# Patient Record
Sex: MALE | Race: WHITE | NOT HISPANIC OR LATINO | URBAN - METROPOLITAN AREA
[De-identification: names, ages, dates, MRNs, and addresses within clinical notes are randomized per-mention and may not be internally consistent; named-entity substitution may affect disease eponyms.]

---

## 2021-11-24 ENCOUNTER — INPATIENT (INPATIENT)
Facility: HOSPITAL | Age: 56
LOS: 2 days | Discharge: HOME | End: 2021-11-27
Attending: INTERNAL MEDICINE | Admitting: INTERNAL MEDICINE
Payer: COMMERCIAL

## 2021-11-24 VITALS
WEIGHT: 198.42 LBS | OXYGEN SATURATION: 99 % | HEART RATE: 61 BPM | SYSTOLIC BLOOD PRESSURE: 172 MMHG | RESPIRATION RATE: 18 BRPM | DIASTOLIC BLOOD PRESSURE: 95 MMHG | TEMPERATURE: 96 F | HEIGHT: 71.65 IN

## 2021-11-24 LAB
ALBUMIN SERPL ELPH-MCNC: 4.7 G/DL — SIGNIFICANT CHANGE UP (ref 3.5–5.2)
ALP SERPL-CCNC: 44 U/L — SIGNIFICANT CHANGE UP (ref 30–115)
ALT FLD-CCNC: 27 U/L — SIGNIFICANT CHANGE UP (ref 0–41)
ANION GAP SERPL CALC-SCNC: 11 MMOL/L — SIGNIFICANT CHANGE UP (ref 7–14)
APTT BLD: 103.7 SEC — CRITICAL HIGH (ref 27–39.2)
APTT BLD: 34.4 SEC — SIGNIFICANT CHANGE UP (ref 27–39.2)
AST SERPL-CCNC: 71 U/L — HIGH (ref 0–41)
BASOPHILS # BLD AUTO: 0.04 K/UL — SIGNIFICANT CHANGE UP (ref 0–0.2)
BASOPHILS NFR BLD AUTO: 0.4 % — SIGNIFICANT CHANGE UP (ref 0–1)
BILIRUB SERPL-MCNC: 0.4 MG/DL — SIGNIFICANT CHANGE UP (ref 0.2–1.2)
BUN SERPL-MCNC: 17 MG/DL — SIGNIFICANT CHANGE UP (ref 10–20)
CALCIUM SERPL-MCNC: 9.2 MG/DL — SIGNIFICANT CHANGE UP (ref 8.5–10.1)
CHLORIDE SERPL-SCNC: 106 MMOL/L — SIGNIFICANT CHANGE UP (ref 98–110)
CK MB CFR SERPL CALC: 32.2 NG/ML — HIGH (ref 0.6–6.3)
CK SERPL-CCNC: 624 U/L — HIGH (ref 0–225)
CO2 SERPL-SCNC: 25 MMOL/L — SIGNIFICANT CHANGE UP (ref 17–32)
CREAT SERPL-MCNC: 1 MG/DL — SIGNIFICANT CHANGE UP (ref 0.7–1.5)
EOSINOPHIL # BLD AUTO: 0.13 K/UL — SIGNIFICANT CHANGE UP (ref 0–0.7)
EOSINOPHIL NFR BLD AUTO: 1.4 % — SIGNIFICANT CHANGE UP (ref 0–8)
GLUCOSE SERPL-MCNC: 93 MG/DL — SIGNIFICANT CHANGE UP (ref 70–99)
HCT VFR BLD CALC: 47.3 % — SIGNIFICANT CHANGE UP (ref 42–52)
HGB BLD-MCNC: 15.7 G/DL — SIGNIFICANT CHANGE UP (ref 14–18)
IMM GRANULOCYTES NFR BLD AUTO: 0.1 % — SIGNIFICANT CHANGE UP (ref 0.1–0.3)
INR BLD: 1.04 RATIO — SIGNIFICANT CHANGE UP (ref 0.65–1.3)
LYMPHOCYTES # BLD AUTO: 2.18 K/UL — SIGNIFICANT CHANGE UP (ref 1.2–3.4)
LYMPHOCYTES # BLD AUTO: 24 % — SIGNIFICANT CHANGE UP (ref 20.5–51.1)
MAGNESIUM SERPL-MCNC: 2 MG/DL — SIGNIFICANT CHANGE UP (ref 1.8–2.4)
MCHC RBC-ENTMCNC: 29.5 PG — SIGNIFICANT CHANGE UP (ref 27–31)
MCHC RBC-ENTMCNC: 33.2 G/DL — SIGNIFICANT CHANGE UP (ref 32–37)
MCV RBC AUTO: 88.7 FL — SIGNIFICANT CHANGE UP (ref 80–94)
MONOCYTES # BLD AUTO: 0.76 K/UL — HIGH (ref 0.1–0.6)
MONOCYTES NFR BLD AUTO: 8.4 % — SIGNIFICANT CHANGE UP (ref 1.7–9.3)
NEUTROPHILS # BLD AUTO: 5.95 K/UL — SIGNIFICANT CHANGE UP (ref 1.4–6.5)
NEUTROPHILS NFR BLD AUTO: 65.7 % — SIGNIFICANT CHANGE UP (ref 42.2–75.2)
NRBC # BLD: 0 /100 WBCS — SIGNIFICANT CHANGE UP (ref 0–0)
NT-PROBNP SERPL-SCNC: 192 PG/ML — SIGNIFICANT CHANGE UP (ref 0–300)
PLATELET # BLD AUTO: 170 K/UL — SIGNIFICANT CHANGE UP (ref 130–400)
POTASSIUM SERPL-MCNC: 4.2 MMOL/L — SIGNIFICANT CHANGE UP (ref 3.5–5)
POTASSIUM SERPL-SCNC: 4.2 MMOL/L — SIGNIFICANT CHANGE UP (ref 3.5–5)
PROT SERPL-MCNC: 6.7 G/DL — SIGNIFICANT CHANGE UP (ref 6–8)
PROTHROM AB SERPL-ACNC: 11.9 SEC — SIGNIFICANT CHANGE UP (ref 9.95–12.87)
RBC # BLD: 5.33 M/UL — SIGNIFICANT CHANGE UP (ref 4.7–6.1)
RBC # FLD: 12.3 % — SIGNIFICANT CHANGE UP (ref 11.5–14.5)
SARS-COV-2 RNA SPEC QL NAA+PROBE: SIGNIFICANT CHANGE UP
SODIUM SERPL-SCNC: 142 MMOL/L — SIGNIFICANT CHANGE UP (ref 135–146)
TROPONIN T SERPL-MCNC: 0.83 NG/ML — CRITICAL HIGH
TROPONIN T SERPL-MCNC: 1.33 NG/ML — CRITICAL HIGH
TROPONIN T SERPL-MCNC: 1.48 NG/ML — CRITICAL HIGH
WBC # BLD: 9.07 K/UL — SIGNIFICANT CHANGE UP (ref 4.8–10.8)
WBC # FLD AUTO: 9.07 K/UL — SIGNIFICANT CHANGE UP (ref 4.8–10.8)

## 2021-11-24 PROCEDURE — 93010 ELECTROCARDIOGRAM REPORT: CPT

## 2021-11-24 PROCEDURE — 99223 1ST HOSP IP/OBS HIGH 75: CPT

## 2021-11-24 PROCEDURE — 99291 CRITICAL CARE FIRST HOUR: CPT

## 2021-11-24 PROCEDURE — 71045 X-RAY EXAM CHEST 1 VIEW: CPT | Mod: 26

## 2021-11-24 RX ORDER — METOPROLOL TARTRATE 50 MG
25 TABLET ORAL
Refills: 0 | Status: DISCONTINUED | OUTPATIENT
Start: 2021-11-24 | End: 2021-11-27

## 2021-11-24 RX ORDER — ASPIRIN/CALCIUM CARB/MAGNESIUM 324 MG
81 TABLET ORAL DAILY
Refills: 0 | Status: DISCONTINUED | OUTPATIENT
Start: 2021-11-25 | End: 2021-11-27

## 2021-11-24 RX ORDER — INFLUENZA VIRUS VACCINE 15; 15; 15; 15 UG/.5ML; UG/.5ML; UG/.5ML; UG/.5ML
0.5 SUSPENSION INTRAMUSCULAR ONCE
Refills: 0 | Status: DISCONTINUED | OUTPATIENT
Start: 2021-11-24 | End: 2021-11-26

## 2021-11-24 RX ORDER — HEPARIN SODIUM 5000 [USP'U]/ML
1500 INJECTION INTRAVENOUS; SUBCUTANEOUS
Qty: 25000 | Refills: 0 | Status: DISCONTINUED | OUTPATIENT
Start: 2021-11-24 | End: 2021-11-26

## 2021-11-24 RX ORDER — HEPARIN SODIUM 5000 [USP'U]/ML
INJECTION INTRAVENOUS; SUBCUTANEOUS
Qty: 25000 | Refills: 0 | Status: DISCONTINUED | OUTPATIENT
Start: 2021-11-24 | End: 2021-11-24

## 2021-11-24 RX ORDER — HEPARIN SODIUM 5000 [USP'U]/ML
7000 INJECTION INTRAVENOUS; SUBCUTANEOUS ONCE
Refills: 0 | Status: COMPLETED | OUTPATIENT
Start: 2021-11-24 | End: 2021-11-24

## 2021-11-24 RX ORDER — HEPARIN SODIUM 5000 [USP'U]/ML
1600 INJECTION INTRAVENOUS; SUBCUTANEOUS
Qty: 25000 | Refills: 0 | Status: DISCONTINUED | OUTPATIENT
Start: 2021-11-24 | End: 2021-11-24

## 2021-11-24 RX ORDER — ONDANSETRON 8 MG/1
4 TABLET, FILM COATED ORAL EVERY 8 HOURS
Refills: 0 | Status: DISCONTINUED | OUTPATIENT
Start: 2021-11-24 | End: 2021-11-27

## 2021-11-24 RX ORDER — ACETAMINOPHEN 500 MG
650 TABLET ORAL EVERY 6 HOURS
Refills: 0 | Status: DISCONTINUED | OUTPATIENT
Start: 2021-11-24 | End: 2021-11-27

## 2021-11-24 RX ORDER — ATORVASTATIN CALCIUM 80 MG/1
80 TABLET, FILM COATED ORAL AT BEDTIME
Refills: 0 | Status: DISCONTINUED | OUTPATIENT
Start: 2021-11-24 | End: 2021-11-27

## 2021-11-24 RX ORDER — ASPIRIN/CALCIUM CARB/MAGNESIUM 324 MG
324 TABLET ORAL ONCE
Refills: 0 | Status: COMPLETED | OUTPATIENT
Start: 2021-11-24 | End: 2021-11-24

## 2021-11-24 RX ORDER — LANOLIN ALCOHOL/MO/W.PET/CERES
3 CREAM (GRAM) TOPICAL AT BEDTIME
Refills: 0 | Status: DISCONTINUED | OUTPATIENT
Start: 2021-11-24 | End: 2021-11-27

## 2021-11-24 RX ADMIN — ATORVASTATIN CALCIUM 80 MILLIGRAM(S): 80 TABLET, FILM COATED ORAL at 21:15

## 2021-11-24 RX ADMIN — Medication 324 MILLIGRAM(S): at 14:00

## 2021-11-24 RX ADMIN — HEPARIN SODIUM 7000 UNIT(S): 5000 INJECTION INTRAVENOUS; SUBCUTANEOUS at 14:00

## 2021-11-24 RX ADMIN — HEPARIN SODIUM 1600 UNIT(S)/HR: 5000 INJECTION INTRAVENOUS; SUBCUTANEOUS at 14:30

## 2021-11-24 NOTE — H&P ADULT - HISTORY OF PRESENT ILLNESS
56 years old male, Active smoker, no known PMHx  presents to ED for the evaluation for the chest pain for last two weeks.    Patient reports that he has been having chest pain for last couple of weeks. Pain is located on the left side of the chest, intermittent, 7/10 in intensity, non-radiating, precipitated by smoking and relives by its own within 5-10 minutes. He is a , he reports that yesterday he lifted some heavy weights that caused him some pain in both the shoulders. Today he decided to walk in to ED for the evaluation of chest pain.   He denies any SOB, palpitation, diaphoresis, lightheadedness, family Hx of premature cardiac disease.     In the ED labs were unremarkable except for troponin of 0.83.  EKG showed normal sinus rhythm w/o ST-wave changes.   Vitals signs were remarkable for BP of 172/95 mmhg

## 2021-11-24 NOTE — H&P ADULT - ASSESSMENT
56 years old male, Active smoker, no known PMHx  presents to ED for the evaluation for the chest pain for last two weeks.    Impression:  # NSTEMI    Plan :    - patient w/ Hx of atypical chest pain for last two weeks  - Vital signs normal  - elevated troponin 0.83, trend troponin. No EKG change noted  - s/p  mg and heparin bolus  in ED  - c/w heparin gtt, monitor PTT  - started on BB and Statin  - 2D echo ordered  - check lipid profile and A1C   - admit to ICU, cardiology to follow    GI ppx : not needed  DVT ppx : heparin gtt  bedrest  Code : full

## 2021-11-24 NOTE — PATIENT PROFILE ADULT - NSPROIMPLANTSMEDDEV_GEN_A_NUR
Patient reported significant abdominal pain with radiation to back during IV Iron infusion.  Patient writhing in pain in bed and assisted to bedside commode.  Patient had large bowel movement which was partially formed and turned to liquid.  Post bowel movement, patient continued to report severe abdominal pain.  Provider notified and orders received.   None

## 2021-11-24 NOTE — PATIENT PROFILE ADULT - NSTOBACCOCESSATIONEDU4_GEN_A_NUR
March 13, 2019      June Lovell   [address] [facility] [id number]       Dear June Lovell:    Our records indicate that you have missed, or failed to cancel, one or more appointments with us during the last 12 months.    [facility] requests that patients notify the provider's office of cancellations by 5:00 pm the day prior to their appointment, and to arrive within 10 minutes of the scheduled appointment time.  For appointments scheduled on Monday, please notify the appointment desk by noon on Saturday.  MyChart patients can cancel their appointments on their MyChart page.    Cancelling your appointment with proper notice allows other patients the opportunity to be seen. The [facility] policy states that a fee of $50 will be assessed only after you miss three or more appointments in a 12 month period.    The following are your missed appointments:  Recent No Show Appointments     No appointments to display           Your health care is important to us.  Please call our office at your earliest convenience to reschedule your appointment or to inform us of any possible scheduling errors.      We look forward to hearing from you soon. Thank you for understanding our position in regards to this problem. If you have any questions, please don't hesitate to contact us.       Sincerely,  [facility]   Smoking even a single puff increases the likelihood of a full relapse, withdrawal symptoms peak within 1-2 weeks, but can persist for months

## 2021-11-24 NOTE — CONSULT NOTE ADULT - ASSESSMENT
Assessment:  55yo male with c/o chest pain admitted to ICU for NSTEMI. 1hr episode of left sided chest pain yesterday evening with resolution prior bedtime. Similar episode 1 week ago. Denies medical history or medication use. Current everyday smoker, works in construction, last stress test 2 years ago - negative. Aspirin 325mg PO given in ED, on Heparin drip currently, pain free.    Plan:  *NSTEMI  no ST changes on ecg  - ICU care  - Cath Friday  - c/w Heparin drip and titrate per protocol  - on Metoprolol  - add atorvastatin 80 mg daily  - trend troponin pending  - order TTE  - check CK-MB, CK, TSH, Mg, Phos  - serial ecg  - NPO  - trend lytes and replete  - bed rest

## 2021-11-24 NOTE — H&P ADULT - NSHPPHYSICALEXAM_GEN_ALL_CORE
LOS:     VITALS:   T(C): 36.4 (11-24-21 @ 14:27), Max: 36.4 (11-24-21 @ 14:27)  HR: 62 (11-24-21 @ 14:49) (61 - 62)  BP: 156/88 (11-24-21 @ 14:49) (156/88 - 172/95)  RR: 18 (11-24-21 @ 14:49) (18 - 18)  SpO2: 98% (11-24-21 @ 14:49) (98% - 99%)    GENERAL: NAD, lying in bed comfortably  HEAD:  Atraumatic, Normocephalic  EYES: EOMI, PERRLA, conjunctiva and sclera clear  ENT: Moist mucous membranes  NECK: Supple, No JVD  CHEST/LUNG: Clear to auscultation bilaterally; No rales, rhonchi, wheezing, or rubs. Unlabored respirations  HEART: Regular rate and rhythm; No murmurs, rubs, or gallops  ABDOMEN: BSx4; Soft, nontender, nondistended  EXTREMITIES:  2+ Peripheral Pulses, brisk capillary refill. No clubbing, cyanosis, or edema  NERVOUS SYSTEM:  A&Ox3, no focal deficits   SKIN: No rashes or lesions

## 2021-11-24 NOTE — ED PROVIDER NOTE - PROGRESS NOTE DETAILS
approved to ccu by shmuel, rec asa and heparin. I personally evaluated the patient. I reviewed the Resident’s or Physician Assistant’s note (as assigned above), and agree with the findings and plan except as documented in my note.  55 y/o M with no PMHx, not on any medications presents with intermittent left sided CP that began yesterday morning. Pt states the pain started yesterday morning while at home and resided before intensifying last night which woke the pt from sleeping. Pt states it was slightly improved this morning, but still present so he came to the ED. Denies fever, chills, HA, diaphoresis, SOB, abdominal pain, n/v, numbness, weakness and tingling. On exam: NCAT. PERRLA, EOMI. OP clear. Lungs CTAB. RRR, S1S2 noted. Radial pulses 2+ and equal, pedal pulses 2+ and equal. Abdomen soft, NT/ND, no rebound or guarding. FROM x4 extremities. No focal neuro deficits. A/P: pt hx concerning for potential ACS. Obtained EKG, no STEMI noted. Will troponin and likely admit.

## 2021-11-24 NOTE — H&P ADULT - ATTENDING COMMENTS
56 years old male, Active smoker, no known PMHx  presents to ED for the evaluation for the chest pain for last two weeks.    NSTEMI     - pt seen and evaluated on 11/24   - DC smoking   - aspirin, Lipitor, lopresor   - repeat CE and EKG   - 2DECHO   - cardiology consult

## 2021-11-24 NOTE — ED PROVIDER NOTE - OBJECTIVE STATEMENT
55 yo male, no pmh, + tob use, presents to ed for cp, left sided, started yesterday, mild, aching, no radiation. denies fever,chills, sob, abd pain, nvd.

## 2021-11-24 NOTE — CONSULT NOTE ADULT - ATTENDING COMMENTS
Patient told he had mi. Told he when stable will need a cardiac cath . He is aware risks and benefits. OOB to chair. Long discussion re need stop smoking.

## 2021-11-24 NOTE — ED PROVIDER NOTE - ATTENDING CONTRIBUTION TO CARE
I was present for and supervised the key and critical aspects of the procedures performed during the care of the patient. I personally evaluated the patient. I reviewed the Resident’s or Physician Assistant’s note (as assigned above), and agree with the findings and plan except as documented in my note.  55 y/o M with no PMHx, not on any medications presents with intermittent left sided CP that began yesterday morning. Pt states the pain started yesterday morning while at home and resided before intensifying last night which woke the pt from sleeping. Pt states it was slightly improved this morning, but still present so he came to the ED. Denies fever, chills, HA, diaphoresis, SOB, abdominal pain, n/v, numbness, weakness and tingling. On exam: NCAT. PERRLA, EOMI. OP clear. Lungs CTAB. RRR, S1S2 noted. Radial pulses 2+ and equal, pedal pulses 2+ and equal. Abdomen soft, NT/ND, no rebound or guarding. FROM x4 extremities. No focal neuro deficits. A/P: pt hx concerning for potential ACS. Obtained EKG, no STEMI noted. Will obtain troponin and likely admit to ccu.

## 2021-11-24 NOTE — ED PROVIDER NOTE - CLINICAL SUMMARY MEDICAL DECISION MAKING FREE TEXT BOX
patient admitted to ccu concern for nstemi given presentation and past medical history we discussed the case with dr gutierrez I will admit for further workup at this time

## 2021-11-24 NOTE — ED ADULT NURSE NOTE - CAS EDN DISCHARGE ASSESSMENT
Patient baseline mental status Alert and oriented to person, place and time/Patient baseline mental status/No adverse reaction to first time med in ED

## 2021-11-25 LAB
A1C WITH ESTIMATED AVERAGE GLUCOSE RESULT: 5.7 % — HIGH (ref 4–5.6)
ALBUMIN SERPL ELPH-MCNC: 4.5 G/DL — SIGNIFICANT CHANGE UP (ref 3.5–5.2)
ALP SERPL-CCNC: 42 U/L — SIGNIFICANT CHANGE UP (ref 30–115)
ALT FLD-CCNC: 29 U/L — SIGNIFICANT CHANGE UP (ref 0–41)
ANION GAP SERPL CALC-SCNC: 13 MMOL/L — SIGNIFICANT CHANGE UP (ref 7–14)
APTT BLD: 83.6 SEC — CRITICAL HIGH (ref 27–39.2)
APTT BLD: 84.5 SEC — CRITICAL HIGH (ref 27–39.2)
AST SERPL-CCNC: 60 U/L — HIGH (ref 0–41)
BASOPHILS # BLD AUTO: 0.05 K/UL — SIGNIFICANT CHANGE UP (ref 0–0.2)
BASOPHILS NFR BLD AUTO: 0.6 % — SIGNIFICANT CHANGE UP (ref 0–1)
BILIRUB SERPL-MCNC: 0.4 MG/DL — SIGNIFICANT CHANGE UP (ref 0.2–1.2)
BUN SERPL-MCNC: 15 MG/DL — SIGNIFICANT CHANGE UP (ref 10–20)
CALCIUM SERPL-MCNC: 8.9 MG/DL — SIGNIFICANT CHANGE UP (ref 8.5–10.1)
CHLORIDE SERPL-SCNC: 101 MMOL/L — SIGNIFICANT CHANGE UP (ref 98–110)
CHOLEST SERPL-MCNC: 170 MG/DL — SIGNIFICANT CHANGE UP
CK MB CFR SERPL CALC: 15.5 NG/ML — HIGH (ref 0.6–6.3)
CK SERPL-CCNC: 405 U/L — HIGH (ref 0–225)
CO2 SERPL-SCNC: 21 MMOL/L — SIGNIFICANT CHANGE UP (ref 17–32)
CREAT SERPL-MCNC: 1 MG/DL — SIGNIFICANT CHANGE UP (ref 0.7–1.5)
EOSINOPHIL # BLD AUTO: 0.15 K/UL — SIGNIFICANT CHANGE UP (ref 0–0.7)
EOSINOPHIL NFR BLD AUTO: 1.8 % — SIGNIFICANT CHANGE UP (ref 0–8)
ESTIMATED AVERAGE GLUCOSE: 117 MG/DL — HIGH (ref 68–114)
GLUCOSE SERPL-MCNC: 112 MG/DL — HIGH (ref 70–99)
HCT VFR BLD CALC: 45.5 % — SIGNIFICANT CHANGE UP (ref 42–52)
HCV AB S/CO SERPL IA: 0.05 COI — SIGNIFICANT CHANGE UP
HCV AB SERPL-IMP: SIGNIFICANT CHANGE UP
HDLC SERPL-MCNC: 33 MG/DL — LOW
HGB BLD-MCNC: 15.5 G/DL — SIGNIFICANT CHANGE UP (ref 14–18)
IMM GRANULOCYTES NFR BLD AUTO: 0.2 % — SIGNIFICANT CHANGE UP (ref 0.1–0.3)
LIPID PNL WITH DIRECT LDL SERPL: 107 MG/DL — HIGH
LYMPHOCYTES # BLD AUTO: 2.48 K/UL — SIGNIFICANT CHANGE UP (ref 1.2–3.4)
LYMPHOCYTES # BLD AUTO: 29.9 % — SIGNIFICANT CHANGE UP (ref 20.5–51.1)
MCHC RBC-ENTMCNC: 29.4 PG — SIGNIFICANT CHANGE UP (ref 27–31)
MCHC RBC-ENTMCNC: 34.1 G/DL — SIGNIFICANT CHANGE UP (ref 32–37)
MCV RBC AUTO: 86.3 FL — SIGNIFICANT CHANGE UP (ref 80–94)
MONOCYTES # BLD AUTO: 0.65 K/UL — HIGH (ref 0.1–0.6)
MONOCYTES NFR BLD AUTO: 7.8 % — SIGNIFICANT CHANGE UP (ref 1.7–9.3)
NEUTROPHILS # BLD AUTO: 4.94 K/UL — SIGNIFICANT CHANGE UP (ref 1.4–6.5)
NEUTROPHILS NFR BLD AUTO: 59.7 % — SIGNIFICANT CHANGE UP (ref 42.2–75.2)
NON HDL CHOLESTEROL: 137 MG/DL — HIGH
NRBC # BLD: 0 /100 WBCS — SIGNIFICANT CHANGE UP (ref 0–0)
PLATELET # BLD AUTO: 154 K/UL — SIGNIFICANT CHANGE UP (ref 130–400)
POTASSIUM SERPL-MCNC: 4.1 MMOL/L — SIGNIFICANT CHANGE UP (ref 3.5–5)
POTASSIUM SERPL-SCNC: 4.1 MMOL/L — SIGNIFICANT CHANGE UP (ref 3.5–5)
PROT SERPL-MCNC: 6.4 G/DL — SIGNIFICANT CHANGE UP (ref 6–8)
RBC # BLD: 5.27 M/UL — SIGNIFICANT CHANGE UP (ref 4.7–6.1)
RBC # FLD: 12.3 % — SIGNIFICANT CHANGE UP (ref 11.5–14.5)
SODIUM SERPL-SCNC: 135 MMOL/L — SIGNIFICANT CHANGE UP (ref 135–146)
TRIGL SERPL-MCNC: 177 MG/DL — HIGH
TROPONIN T SERPL-MCNC: 0.76 NG/ML — CRITICAL HIGH
WBC # BLD: 8.29 K/UL — SIGNIFICANT CHANGE UP (ref 4.8–10.8)
WBC # FLD AUTO: 8.29 K/UL — SIGNIFICANT CHANGE UP (ref 4.8–10.8)

## 2021-11-25 PROCEDURE — 99233 SBSQ HOSP IP/OBS HIGH 50: CPT

## 2021-11-25 PROCEDURE — 93010 ELECTROCARDIOGRAM REPORT: CPT

## 2021-11-25 RX ADMIN — Medication 25 MILLIGRAM(S): at 05:53

## 2021-11-25 RX ADMIN — ATORVASTATIN CALCIUM 80 MILLIGRAM(S): 80 TABLET, FILM COATED ORAL at 21:18

## 2021-11-25 RX ADMIN — HEPARIN SODIUM 13 UNIT(S)/HR: 5000 INJECTION INTRAVENOUS; SUBCUTANEOUS at 18:49

## 2021-11-25 RX ADMIN — HEPARIN SODIUM 14 UNIT(S)/HR: 5000 INJECTION INTRAVENOUS; SUBCUTANEOUS at 08:03

## 2021-11-25 RX ADMIN — Medication 25 MILLIGRAM(S): at 17:16

## 2021-11-25 RX ADMIN — Medication 81 MILLIGRAM(S): at 11:50

## 2021-11-25 NOTE — PROGRESS NOTE ADULT - SUBJECTIVE AND OBJECTIVE BOX
Patient denies any chest pain today      T(F): 97 (11-25-21 @ 11:00), Max: 97.8 (11-24-21 @ 23:00)  HR: 54 (11-25-21 @ 09:00)  BP: 117/74 (11-25-21 @ 09:00)  RR: 18 (11-25-21 @ 11:00)  SpO2: 99% (11-25-21 @ 09:00) (97% - 100%)    PHYSICAL EXAM:  GENERAL: NAD  HEAD:  Atraumatic, Normocephalic  EYES: EOMI, PERRLA, conjunctiva and sclera clear  NERVOUS SYSTEM:  Alert & Oriented X3, no focal deficits   CHEST/LUNG: Clear to percussion bilaterally; No rales, rhonchi, wheezing, or rubs  HEART: Regular rate and rhythm; No murmurs, rubs, or gallops  ABDOMEN: Soft, Nontender, Nondistended; Bowel sounds present  EXTREMITIES:  2+ Peripheral Pulses, No clubbing, cyanosis, or edema    LABS  11-25    135  |  101  |  15  ----------------------------<  112<H>  4.1   |  21  |  1.0    Ca    8.9      25 Nov 2021 05:41  Mg     2.0     11-24    TPro  6.4  /  Alb  4.5  /  TBili  0.4  /  DBili  x   /  AST  60<H>  /  ALT  29  /  AlkPhos  42  11-25                          15.5   8.29  )-----------( 154      ( 25 Nov 2021 05:41 )             45.5     PT/INR - ( 24 Nov 2021 13:44 )   PT: 11.90 sec;   INR: 1.04 ratio         PTT - ( 25 Nov 2021 05:41 )  PTT:83.6 sec    CARDIAC ENZYMES  Creatine Kinase, Serum: 405 (11-25 @ 05:41)  Creatine Kinase, Serum: 624 (11-24 @ 19:47)    CKMB Units: 15.5 (11-25 @ 05:41)  CKMB Units: 32.2 (11-24 @ 19:47)    Troponin T, Serum: 0.76 ng/mL (11-25-21 @ 05:41)  Troponin T, Serum: 1.33 ng/mL (11-24-21 @ 21:54)  Troponin T, Serum: 1.48 ng/mL (11-24-21 @ 19:47)  Troponin T, Serum: 0.83 ng/mL (11-24-21 @ 12:25)    < from: 12 Lead ECG (11.25.21 @ 08:01) >  Diagnosis Line Sinus bradycardia  Possible , age undetermined  Abnormal ECG    < end of copied text >      RADIOLOGY  < from: Xray Chest 1 View-PORTABLE IMMEDIATE (11.24.21 @ 13:24) >    Impression:  There is no evidence of focal consolidation, pleural effusion or pneumothorax.    < end of copied text >    MEDICATIONS  (STANDING):  aspirin  chewable 81 milliGRAM(s) Oral daily  atorvastatin 80 milliGRAM(s) Oral at bedtime  heparin  Infusion 1500 Unit(s)/Hr (14 mL/Hr) IV Continuous <Continuous>  influenza   Vaccine 0.5 milliLiter(s) IntraMuscular once  metoprolol tartrate 25 milliGRAM(s) Oral two times a day    MEDICATIONS  (PRN):  acetaminophen     Tablet .. 650 milliGRAM(s) Oral every 6 hours PRN Temp greater or equal to 38C (100.4F), Mild Pain (1 - 3)  aluminum hydroxide/magnesium hydroxide/simethicone Suspension 30 milliLiter(s) Oral every 4 hours PRN Dyspepsia  melatonin 3 milliGRAM(s) Oral at bedtime PRN Insomnia  ondansetron Injectable 4 milliGRAM(s) IV Push every 8 hours PRN Nausea and/or Vomiting

## 2021-11-26 LAB
APTT BLD: 77 SEC — CRITICAL HIGH (ref 27–39.2)
COVID-19 NUCLEOCAPSID GAM AB INTERP: NEGATIVE — SIGNIFICANT CHANGE UP
COVID-19 NUCLEOCAPSID TOTAL GAM ANTIBODY RESULT: 0.08 INDEX — SIGNIFICANT CHANGE UP
COVID-19 SPIKE DOMAIN AB INTERP: POSITIVE
COVID-19 SPIKE DOMAIN ANTIBODY RESULT: 213 U/ML — HIGH
HCT VFR BLD CALC: 46.2 % — SIGNIFICANT CHANGE UP (ref 42–52)
HGB BLD-MCNC: 15.6 G/DL — SIGNIFICANT CHANGE UP (ref 14–18)
MCHC RBC-ENTMCNC: 29.2 PG — SIGNIFICANT CHANGE UP (ref 27–31)
MCHC RBC-ENTMCNC: 33.8 G/DL — SIGNIFICANT CHANGE UP (ref 32–37)
MCV RBC AUTO: 86.5 FL — SIGNIFICANT CHANGE UP (ref 80–94)
NRBC # BLD: 0 /100 WBCS — SIGNIFICANT CHANGE UP (ref 0–0)
PLATELET # BLD AUTO: 152 K/UL — SIGNIFICANT CHANGE UP (ref 130–400)
RBC # BLD: 5.34 M/UL — SIGNIFICANT CHANGE UP (ref 4.7–6.1)
RBC # FLD: 12.4 % — SIGNIFICANT CHANGE UP (ref 11.5–14.5)
SARS-COV-2 IGG+IGM SERPL QL IA: 0.08 INDEX — SIGNIFICANT CHANGE UP
SARS-COV-2 IGG+IGM SERPL QL IA: 213 U/ML — HIGH
SARS-COV-2 IGG+IGM SERPL QL IA: NEGATIVE — SIGNIFICANT CHANGE UP
SARS-COV-2 IGG+IGM SERPL QL IA: POSITIVE
WBC # BLD: 9.03 K/UL — SIGNIFICANT CHANGE UP (ref 4.8–10.8)
WBC # FLD AUTO: 9.03 K/UL — SIGNIFICANT CHANGE UP (ref 4.8–10.8)

## 2021-11-26 PROCEDURE — 93306 TTE W/DOPPLER COMPLETE: CPT | Mod: 26

## 2021-11-26 PROCEDURE — 93458 L HRT ARTERY/VENTRICLE ANGIO: CPT | Mod: 26,XU

## 2021-11-26 PROCEDURE — 92978 ENDOLUMINL IVUS OCT C 1ST: CPT | Mod: 26,59,RC

## 2021-11-26 PROCEDURE — 99233 SBSQ HOSP IP/OBS HIGH 50: CPT

## 2021-11-26 PROCEDURE — 93010 ELECTROCARDIOGRAM REPORT: CPT | Mod: 77

## 2021-11-26 PROCEDURE — 99222 1ST HOSP IP/OBS MODERATE 55: CPT | Mod: 57

## 2021-11-26 PROCEDURE — 92928 PRQ TCAT PLMT NTRAC ST 1 LES: CPT | Mod: 59,LC

## 2021-11-26 PROCEDURE — 93010 ELECTROCARDIOGRAM REPORT: CPT

## 2021-11-26 PROCEDURE — 92929: CPT | Mod: 59,RC

## 2021-11-26 RX ORDER — SODIUM CHLORIDE 9 MG/ML
1000 INJECTION INTRAMUSCULAR; INTRAVENOUS; SUBCUTANEOUS
Refills: 0 | Status: DISCONTINUED | OUTPATIENT
Start: 2021-11-26 | End: 2021-11-27

## 2021-11-26 RX ORDER — CLOPIDOGREL BISULFATE 75 MG/1
75 TABLET, FILM COATED ORAL DAILY
Refills: 0 | Status: DISCONTINUED | OUTPATIENT
Start: 2021-11-27 | End: 2021-11-27

## 2021-11-26 RX ORDER — VALPROIC ACID (AS SODIUM SALT) 250 MG/5ML
1500 SOLUTION, ORAL ORAL EVERY 12 HOURS
Refills: 0 | Status: DISCONTINUED | OUTPATIENT
Start: 2021-11-26 | End: 2021-11-26

## 2021-11-26 RX ADMIN — Medication 25 MILLIGRAM(S): at 06:12

## 2021-11-26 RX ADMIN — Medication 81 MILLIGRAM(S): at 10:29

## 2021-11-26 RX ADMIN — HEPARIN SODIUM 13 UNIT(S)/HR: 5000 INJECTION INTRAVENOUS; SUBCUTANEOUS at 06:10

## 2021-11-26 RX ADMIN — ATORVASTATIN CALCIUM 80 MILLIGRAM(S): 80 TABLET, FILM COATED ORAL at 22:08

## 2021-11-26 RX ADMIN — SODIUM CHLORIDE 100 MILLILITER(S): 9 INJECTION INTRAMUSCULAR; INTRAVENOUS; SUBCUTANEOUS at 16:02

## 2021-11-26 NOTE — CHART NOTE - NSCHARTNOTEFT_GEN_A_CORE
56 years old male, Active smoker, no known PMHx  presents to ED for the evaluation for the chest pain for last two weeks.    Patient reports that he has been having chest pain for last couple of weeks. Pain is located on the left side of the chest, intermittent, 7/10 in intensity, non-radiating, precipitated by smoking and relives by its own within 5-10 minutes. He is a , he reports that yesterday he lifted some heavy weights that caused him some pain in both the shoulders. Today he decided to walk in to ED for the evaluation of chest pain.   He denies any SOB, palpitation, diaphoresis, lightheadedness, family Hx of premature cardiac disease.     In the ED labs were unremarkable except for troponin of 0.83 -> 1.4 -> 0.7  EKG showed normal sinus rhythm w/o ST-wave changes.   Vitals signs were remarkable for BP of 172/95 mmhg    During his hospital stay at [facility], his troponin trended up although patient remained asymptomatic. He was maintained on heparin gtt. Patient is being transferred to [facility] for cardiac cath.    Impression:  # NSTEMI    Plan :    - patient w/ Hx of atypical chest pain for last two weeks  - Vital signs normal, elevated troponin 0.83 0.83 -> 1.4 -> 0.7  No EKG changes noted  - c/w ASA 81 mg  - c/w heparin gtt, monitor PTT  - started on BB and Statin  - 2D echo ordered  - pre-diabetic, lipid profile c/ hyperlipidemia  - cardiac cath planned for today  -  on smoking cessation    GI ppx : not needed  DVT ppx : heparin gtt  bedrest  Code : full

## 2021-11-26 NOTE — PROGRESS NOTE ADULT - SUBJECTIVE AND OBJECTIVE BOX
CC.  CP  HPI.  Patient reports that he feels good.  no CP/SOB        Vital Signs Last 24 Hrs  T(C): 35.6 (11-26-21 @ 07:01), Max: 36.8 (11-25-21 @ 15:00)  T(F): 96 (11-26-21 @ 07:01), Max: 98.3 (11-25-21 @ 15:00)  HR: 49 (11-26-21 @ 07:06) (47 - 66)  BP: 122/73 (11-26-21 @ 07:06) (97/59 - 134/74)  BP(mean): 89 (11-26-21 @ 07:06) (73 - 97)  RR: 21 (11-26-21 @ 07:06) (17 - 25)  SpO2: 98% (11-26-21 @ 07:47) (95% - 99%)        PHYSICAL EXAM-  GENERAL: NAD   HEAD:  Atraumatic, Normocephalic  EYES: EOMI, PERRLA, conjunctiva and sclera clear  NECK: Supple, No JVD, Normal thyroid  NERVOUS SYSTEM:  Alert & Oriented X3, Motor Strength 5/5 B/L upper and lower extremities; DTRs 2+ intact and symmetric  CHEST/LUNG: Clear to percussion bilaterally; No rales, rhonchi, wheezing, or rubs  HEART: Regular rate and rhythm; No murmurs, rubs, or gallops  ABDOMEN: Soft, Nontender, Nondistended; Bowel sounds present  EXTREMITIES:  No clubbing, cyanosis, or edema  SKIN: No rashes or lesions                                  15.6   9.03  )-----------( 152      ( 26 Nov 2021 05:45 )             46.2     11-25    135  |  101  |  15  ----------------------------<  112<H>  4.1   |  21  |  1.0    Ca    8.9      25 Nov 2021 05:41  Mg     2.0     11-24    TPro  6.4  /  Alb  4.5  /  TBili  0.4  /  DBili  x   /  AST  60<H>  /  ALT  29  /  AlkPhos  42  11-25    CARDIAC MARKERS ( 25 Nov 2021 05:41 )  x     / 0.76 ng/mL / 405 U/L / x     / 15.5 ng/mL  CARDIAC MARKERS ( 24 Nov 2021 21:54 )  x     / 1.33 ng/mL / x     / x     / x      CARDIAC MARKERS ( 24 Nov 2021 19:47 )  x     / 1.48 ng/mL / 624 U/L / x     / 32.2 ng/mL  CARDIAC MARKERS ( 24 Nov 2021 12:25 )  x     / 0.83 ng/mL / x     / x     / x              PT/INR - ( 24 Nov 2021 13:44 )   PT: 11.90 sec;   INR: 1.04 ratio         PTT - ( 26 Nov 2021 05:45 )  PTT:77.0 sec        MEDICATIONS  (STANDING):  aspirin  chewable 81 milliGRAM(s) Oral daily  atorvastatin 80 milliGRAM(s) Oral at bedtime  heparin  Infusion 1500 Unit(s)/Hr (13 mL/Hr) IV Continuous <Continuous>  influenza   Vaccine 0.5 milliLiter(s) IntraMuscular once  metoprolol tartrate 25 milliGRAM(s) Oral two times a day    MEDICATIONS  (PRN):  acetaminophen     Tablet .. 650 milliGRAM(s) Oral every 6 hours PRN Temp greater or equal to 38C (100.4F), Mild Pain (1 - 3)  aluminum hydroxide/magnesium hydroxide/simethicone Suspension 30 milliLiter(s) Oral every 4 hours PRN Dyspepsia  melatonin 3 milliGRAM(s) Oral at bedtime PRN Insomnia  ondansetron Injectable 4 milliGRAM(s) IV Push every 8 hours PRN Nausea and/or Vomiting        Imaging Personally Reviewed:     [x ] YES  [ ] NO    Consultant(s) Notes Reviewed:  [x ] YES  [ ] NO    Care Discussed with Consultants/Other Providers [x ] YES  [ ] NO  Care Discussed with Consultants/Other Providers [x ] YES  [ ] No medical contraindication for discharge

## 2021-11-26 NOTE — CONSULT NOTE ADULT - SUBJECTIVE AND OBJECTIVE BOX
Patient is a 56y old  Male who presents with a chief complaint of chest pain (26 Nov 2021 10:55)      HPI:  56 years old male, Active smoker, no known PMHx  presents to ED for the evaluation for the chest pain for last two weeks.    Patient reports that he has been having chest pain for last couple of weeks. Pain is located on the left side of the chest, intermittent, 7/10 in intensity, non-radiating, precipitated by smoking and relives by its own within 5-10 minutes. He is a , he reports that yesterday he lifted some heavy weights that caused him some pain in both the shoulders. Today he decided to walk in to ED for the evaluation of chest pain.   He denies any SOB, palpitation, diaphoresis, lightheadedness, family Hx of premature cardiac disease.     In the ED labs were unremarkable except for troponin of 0.83.  EKG showed normal sinus rhythm w/o ST-wave changes.   Vitals signs were remarkable for BP of 172/95 mmhg (24 Nov 2021 15:29)      PAST MEDICAL & SURGICAL HISTORY:  No pertinent past medical history        PREVIOUS DIAGNOSTIC TESTING:      ECHO  FINDINGS:  Pending        CATHETERIZATION  FINDINGS:    MEDICATIONS  (STANDING):  aspirin  chewable 81 milliGRAM(s) Oral daily  atorvastatin 80 milliGRAM(s) Oral at bedtime  metoprolol tartrate 25 milliGRAM(s) Oral two times a day  sodium chloride 0.9%. 1000 milliLiter(s) (100 mL/Hr) IV Continuous <Continuous>    MEDICATIONS  (PRN):  acetaminophen     Tablet .. 650 milliGRAM(s) Oral every 6 hours PRN Temp greater or equal to 38C (100.4F), Mild Pain (1 - 3)  aluminum hydroxide/magnesium hydroxide/simethicone Suspension 30 milliLiter(s) Oral every 4 hours PRN Dyspepsia  melatonin 3 milliGRAM(s) Oral at bedtime PRN Insomnia  ondansetron Injectable 4 milliGRAM(s) IV Push every 8 hours PRN Nausea and/or Vomiting      FAMILY HISTORY:  NC    SOCIAL HISTORY:  CIGARETTES: Active smoker    ALCOHOL: No    REVIEW OF SYSTEMS:  CONSTITUTIONAL: No fever, weight loss, or fatigue  EYES: No eye pain, visual disturbances, or discharge  ENMT:  No difficulty hearing, tinnitus, vertigo; No sinus or throat pain  NECK: No pain or stiffness  RESPIRATORY: No cough, wheezing, chills or hemoptysis; No shortness of breath  CARDIOVASCULAR: See HPI.  GASTROINTESTINAL: No abdominal or epigastric pain. No nausea, vomiting, or hematemesis; No diarrhea or constipation. No melena or hematochezia.  GENITOURINARY: No dysuria, frequency, hematuria, or incontinence  NEUROLOGICAL: No headaches, memory loss, loss of strength, numbness, or tremors  SKIN: No itching, burning, rashes, or lesions   ENDOCRINE: No heat or cold intolerance;   MUSCULOSKELETAL: No joint pain or swelling; No muscle, back, or extremity pain  PSYCHIATRIC: No depression, anxiety, mood swings, or difficulty sleeping  HEME/LYMPH: No easy bruising, or bleeding gums  ALLERY AND IMMUNOLOGIC: No hives or eczema        Vital Signs Last 24 Hrs  T(C): 36 (26 Nov 2021 15:45), Max: 36.3 (26 Nov 2021 04:00)  T(F): 96.8 (26 Nov 2021 15:45), Max: 97.3 (26 Nov 2021 04:00)  HR: 50 (26 Nov 2021 16:00) (47 - 62)  BP: 132/80 (26 Nov 2021 16:00) (97/59 - 132/80)  BP(mean): 101 (26 Nov 2021 16:00) (73 - 101)  RR: 17 (26 Nov 2021 16:00) (17 - 27)  SpO2: 100% (26 Nov 2021 16:00) (95% - 100%)        PHYSICAL EXAM:  GENERAL: NAD, AAO x 3  HEAD:  Atraumatic, Normocephalic  EYES: EOMI, PERRL, conjunctiva and sclera clear  ENMT: Moist mucous membranes  NECK: Supple, No JVD, No bruits  NERVOUS SYSTEM:  Alert & Oriented X3, Good concentration; No focal deficits  CHEST/LUNG: Clear bilaterally; No rales, rhonchi, wheezing, or rubs  HEART: Regular rate and rhythm; Normal S1-S2, No murmurs, rubs, or gallops  ABDOMEN: Soft, Nontender, Nondistended; Bowel sounds present  EXTREMITIES:   No clubbing, cyanosis, or edema  SKIN: No rashes or lesions    INTERPRETATION OF TELEMETRY:    ECG:    I&O's Detail    25 Nov 2021 07:01  -  26 Nov 2021 07:00  --------------------------------------------------------  IN:    Heparin: 340 mL    Oral Fluid: 550 mL  Total IN: 890 mL    OUT:    Voided (mL): 500 mL  Total OUT: 500 mL    Total NET: 390 mL      26 Nov 2021 07:01  -  26 Nov 2021 16:47  --------------------------------------------------------  IN:    Heparin: 26 mL  Total IN: 26 mL    OUT:    Voided (mL): 500 mL  Total OUT: 500 mL    Total NET: -474 mL          LABS:                        15.6   9.03  )-----------( 152      ( 26 Nov 2021 05:45 )             46.2     11-25    135  |  101  |  15  ----------------------------<  112<H>  4.1   |  21  |  1.0    Ca    8.9      25 Nov 2021 05:41    TPro  6.4  /  Alb  4.5  /  TBili  0.4  /  DBili  x   /  AST  60<H>  /  ALT  29  /  AlkPhos  42  11-25    CARDIAC MARKERS ( 25 Nov 2021 05:41 )  x     / 0.76 ng/mL / 405 U/L / x     / 15.5 ng/mL  CARDIAC MARKERS ( 24 Nov 2021 21:54 )  x     / 1.33 ng/mL / x     / x     / x      CARDIAC MARKERS ( 24 Nov 2021 19:47 )  x     / 1.48 ng/mL / 624 U/L / x     / 32.2 ng/mL      PTT - ( 26 Nov 2021 05:45 )  PTT:77.0 sec    I&O's Summary    25 Nov 2021 07:01  -  26 Nov 2021 07:00  --------------------------------------------------------  IN: 890 mL / OUT: 500 mL / NET: 390 mL    26 Nov 2021 07:01  -  26 Nov 2021 16:47  --------------------------------------------------------  IN: 26 mL / OUT: 500 mL / NET: -474 mL          RADIOLOGY & ADDITIONAL STUDIES:
HPI:        HPI-Cardiology   55yo male came with c/o chest pain.  Currently admitted to ICU for NSTEMI. Evaluated at bedside. Radiology tests and hospital records, were reviewed, as well as previous notes on this patient. Pt had 1hr episode of chest pain at rest yesterday evening. Had similar episode a week ago but did not seek for medical help. Denies any medical history or medication use. Current smoker.  Describes pain as left sided pressure not a/w sob, diaphoresis, dizziness or palpitations. Denies orthopnea or portillo. No aggravating or alleviating factors. Pain went away before pt went to bed yesterday and he was chest free today and upon admission. Pain free right now. Last exercuse stress test 2 years ago, negative.      PAST MEDICAL & SURGICAL HISTORY  denies      FAMILY HISTORY:  grandfather - MI/     SOCIAL HISTORY:  []smoker - 0.5pack/day  []Alcohol - occasional  []Drug - denies    ALLERGIES:  No Known Allergies    MEDICATIONS:  MEDICATIONS  (STANDING):  heparin  Infusion.  Unit(s)/Hr (16 mL/Hr) IV Continuous <Continuous>  metoprolol tartrate 25 milliGRAM(s) Oral two times a day  MEDICATIONS  (PRN):  acetaminophen     Tablet .. 650 milliGRAM(s) Oral every 6 hours PRN Temp greater or equal to 38C (100.4F), Mild Pain (1 - 3)  aluminum hydroxide/magnesium hydroxide/simethicone Suspension 30 milliLiter(s) Oral every 4 hours PRN Dyspepsia  melatonin 3 milliGRAM(s) Oral at bedtime PRN Insomnia  ondansetron Injectable 4 milliGRAM(s) IV Push every 8 hours PRN Nausea and/or Vomiting      HOME MEDICATIONS:  Denies    VITALS:   T(F): 97.5 (-24 @ 14:27), Max: 97.5 (11-24 @ 14:27)  HR: 62 ( @ 14:49) (61 - 62)  BP: 156/88 (- @ 14:49) (156/88 - 172/95)  BP(mean): --  RR: 18 ( @ 14:49) (18 - 18)  SpO2: 98% ( @ 14:49) (98% - 99%)  I&O's Summary      REVIEW OF SYSTEMS:  CONSTITUTIONAL: No weakness, fevers or chills  EYES: No visual changes  ENT: No vertigo or throat pain   NECK: No pain or stiffness  RESPIRATORY: No cough, wheezing, hemoptysis; No shortness of breath  CARDIOVASCULAR: No chest pain or palpitations  GASTROINTESTINAL: No abdominal or epigastric pain. No nausea, vomiting, or hematemesis; No diarrhea or constipation. No melena or hematochezia.  GENITOURINARY: No dysuria, frequency or hematuria  NEUROLOGICAL: No numbness or weakness  SKIN: No itching, no rashes  MSK: no    PHYSICAL EXAM:  NEURO: patient is awake , alert and oriented  GEN: Not in acute distress  NECK: no thyroid enlargement, no JVD  LUNGS: Clear to auscultation bilaterally   CARDIOVASCULAR: S1/S2 present, RRR , no murmurs or rubs, no carotid bruits,  + PP bilaterally  ABD: Soft, non-tender, non-distended, +BS  EXT: No ELBA  SKIN: Intact    LABS:                        15.7   9.07  )-----------( 170      ( 2021 12:25 )             47.3     142  |  106  |  17  ----------------------------<  93  4.2   |  25  |  1.0  Ca    9.2      2021 12:25  Mg     2.0       TPro  6.7  /  Alb  4.7  /  TBili  0.4  /  DBili  x   /  AST  71<H>  /  ALT  27  /  AlkPhos  44    PT/INR - ( 2021 13:44 )   PT: 11.90 sec;   INR: 1.04 ratio    PTT - ( 2021 13:44 )  PTT:34.4 sec  Troponin T, Serum: 0.83 ng/mL *HH* (21 @ 12:25)  CARDIAC MARKERS ( 2021 12:25 )  x     / 0.83 ng/mL / x     / x     / x      Serum Pro-Brain Natriuretic Peptide: 192 pg/mL (21 @ 12:25)      RADIOLOGY:  ECG < from: 12 Lead ECG (21 @ 12:07) >  Diagnosis Line Normal sinus rhythm  Normal ECG  < end of copied text >

## 2021-11-26 NOTE — CONSULT NOTE ADULT - ASSESSMENT
Transferred from [facility] for cardiac catheterization for NSTEMI      Admit to CCU  Cath - severe 2 vessel disease, LCX and RCA, s/p PCI of both lesions. IVUS of RCA    Moderate disease LAD/diag - medical tehrapy    C/w DAPT  C/w BB - decrease to succinate 25 mg q24  Add losartan, c/w high dose statin  Smoking cessation  Monitor in CCU  D/c planning for tomorrow, if stable.

## 2021-11-26 NOTE — PROGRESS NOTE ADULT - SUBJECTIVE AND OBJECTIVE BOX
Patient is a 56y old  Male who presents with a chief complaint of chest pain (25 Nov 2021 12:49)      T(F): 97.3 (11-26-21 @ 04:00), Max: 98.3 (11-25-21 @ 15:00)  HR: 51 (11-26-21 @ 06:00)  BP: 119/75 (11-26-21 @ 06:00)  RR: 17 (11-26-21 @ 06:00)  SpO2: 97% (11-26-21 @ 06:00) (95% - 99%)    PHYSICAL EXAM:  GENERAL: NAD, well-groomed, well-developed  HEAD:  Atraumatic, Normocephalic  EYES: EOMI, PERRLA, conjunctiva and sclera clear  ENMT: No tonsillar erythema, exudates, or enlargement; Moist mucous membranes, Good dentition, No lesions  NECK: Supple, No JVD, Normal thyroid  NERVOUS SYSTEM:  Alert & Oriented X3,  Motor Strength 5/5 B/L upper and lower extremities  CHEST/LUNG: Clear to percussion bilaterally; No rales, rhonchi, wheezing, or rubs  HEART: Regular rate and rhythm; No murmurs, rubs, or gallops  ABDOMEN: Soft, Nontender, Nondistended; Bowel sounds present  EXTREMITIES:   No clubbing, cyanosis, or edema  LYMPH: No lymphadenopathy noted  SKIN: No rashes or lesions    labs  11-25    135  |  101  |  15  ----------------------------<  112<H>  4.1   |  21  |  1.0    Ca    8.9      25 Nov 2021 05:41  Mg     2.0     11-24    TPro  6.4  /  Alb  4.5  /  TBili  0.4  /  DBili  x   /  AST  60<H>  /  ALT  29  /  AlkPhos  42  11-25                          15.5   8.29  )-----------( 154      ( 25 Nov 2021 05:41 )             45.5       PT/INR - ( 24 Nov 2021 13:44 )   PT: 11.90 sec;   INR: 1.04 ratio         PTT - ( 25 Nov 2021 15:45 )  PTT:84.5 sec        acetaminophen     Tablet .. 650 milliGRAM(s) Oral every 6 hours PRN  aluminum hydroxide/magnesium hydroxide/simethicone Suspension 30 milliLiter(s) Oral every 4 hours PRN  aspirin  chewable 81 milliGRAM(s) Oral daily  atorvastatin 80 milliGRAM(s) Oral at bedtime  heparin  Infusion 1500 Unit(s)/Hr IV Continuous <Continuous>  influenza   Vaccine 0.5 milliLiter(s) IntraMuscular once  melatonin 3 milliGRAM(s) Oral at bedtime PRN  metoprolol tartrate 25 milliGRAM(s) Oral two times a day  ondansetron Injectable 4 milliGRAM(s) IV Push every 8 hours PRN

## 2021-11-26 NOTE — CHART NOTE - NSCHARTNOTEFT_GEN_A_CORE
PRE-OP DIAGNOSIS:  NSTEMI      PROCEDURE:     [X] Coronary Angiogram     [X] LHC     [] LVG     [] RHC     [X] Intervention (see below)         PHYSICIAN: Dr. Mercer    INTERVENTIONAL FELLOW: Dr. Carrington    FELLOW:  Dr. Guzmán       PROCEDURE DESCRIPTION:     Consent:      [X] Patient     [] Family Member     []  Used        Anesthesia:     [] General     [X] Sedation     [] Local        Access & Closure:     [] Fr Radial Artery     [X] Fr Femoral Artery --> Perclose    [] Fr Femoral Vein     [] Fr Brachial Vein       IV Contrast: 180mL        Intervention: PCI to RCA and LCX with ZOE      Implants:   RCA: 2.75 X 22 [place] ZOE RX and 3.5 X 12 [place] ZOE RX  LCX: 3.0 X 18 [place] ZOE RX       FINDINGS:     Coronary Dominance: Right      LM: Mild disease    LAD: Mild diffuse disease  Dia-70% stenosis    CX:        Prox: Mild disease       Dist: 95% stenosis s/p PCI with ZOE  OM: Mild disease    RCA:        Prox: Mild disease       Mid: 90% stenosis s/p PCI with ZOE (2.75 X 22 [place] ZOE RX and 3.5 X 12 [place] ZOE RX)       Dist: Mild disease       LVEDP: Normal    EF: %        ESTIMATED BLOOD LOSS: < 10 mL        CONDITION:     [X] Good     [] Fair     [] Critical        SPECIMEN REMOVED: N/A       POST-OP DIAGNOSIS:      [X] 2 Vessel Coronary Artery Disease; AUC 8 for revascularization       PLAN OF CARE:     [X] Return to In-patient bed (CCU)    [X] Medications: Aspirin 81mg daily, Plavix 75mg daily, Atorvastatin 80mg daily; continue metoprolol 25mg BID and increase as tolerated    [X] IV Fluids: NS at 100cc/hr for 10 hours    [X] F/U 2D echo    [X] Recommend smoking cessation PRE-OP DIAGNOSIS:  NSTEMI      PROCEDURE:     [X] Coronary Angiogram     [X] LHC     [] LVG     [] RHC     [X] Intervention (see below)         PHYSICIAN: Dr. Mercer    INTERVENTIONAL FELLOW: Dr. Carrington    FELLOW:  Dr. Guzmán       PROCEDURE DESCRIPTION:     Consent:      [X] Patient     [] Family Member     []  Used        Anesthesia:     [] General     [X] Sedation     [] Local        Access & Closure:     [] Fr Radial Artery     [X] Fr Femoral Artery --> Perclose    [] Fr Femoral Vein     [] Fr Brachial Vein       IV Contrast: 180mL        Intervention: PCI to RCA and LCX with ZOE      Implants:     RCA: 2.75 X 22 [place] ZOE RX and 3.5 X 12 [place] ZOE RX  LCX: 3.0 X 18 [place] ZOE RX       FINDINGS:     Coronary Dominance: Right      LM: Mild disease    LAD: Mild diffuse disease  Dia-70% stenosis    CX:        Prox: Mild disease       Dist: 95% stenosis s/p PCI with ZOE  OM: Mild disease    RCA:        Prox: Mild disease       Mid: 90% stenosis s/p PCI with ZOE (2.75 X 22 [place] ZOE RX and 3.5 X 12 [place] ZOE RX)       Dist: Mild disease       LVEDP: Normal    EF: %        ESTIMATED BLOOD LOSS: < 10 mL        CONDITION:     [X] Good     [] Fair     [] Critical        SPECIMEN REMOVED: N/A       POST-OP DIAGNOSIS:      [X] 2 Vessel Coronary Artery Disease; AUC 8 for revascularization       PLAN OF CARE:     [X] Return to In-patient bed (CCU)    [X] Medications: Aspirin 81mg daily, Plavix 75mg daily, Atorvastatin 80mg daily;    [X] IV Fluids: NS at 100cc/hr for 10 hours    [X] F/U 2D echo    [X] Recommend smoking cessation

## 2021-11-27 ENCOUNTER — TRANSCRIPTION ENCOUNTER (OUTPATIENT)
Age: 56
End: 2021-11-27

## 2021-11-27 VITALS
DIASTOLIC BLOOD PRESSURE: 58 MMHG | SYSTOLIC BLOOD PRESSURE: 116 MMHG | HEART RATE: 60 BPM | RESPIRATION RATE: 18 BRPM | OXYGEN SATURATION: 98 %

## 2021-11-27 LAB
ALBUMIN SERPL ELPH-MCNC: 4.2 G/DL — SIGNIFICANT CHANGE UP (ref 3.5–5.2)
ALP SERPL-CCNC: 41 U/L — SIGNIFICANT CHANGE UP (ref 30–115)
ALT FLD-CCNC: 23 U/L — SIGNIFICANT CHANGE UP (ref 0–41)
ANION GAP SERPL CALC-SCNC: 18 MMOL/L — HIGH (ref 7–14)
AST SERPL-CCNC: 24 U/L — SIGNIFICANT CHANGE UP (ref 0–41)
BILIRUB SERPL-MCNC: 0.8 MG/DL — SIGNIFICANT CHANGE UP (ref 0.2–1.2)
BUN SERPL-MCNC: 10 MG/DL — SIGNIFICANT CHANGE UP (ref 10–20)
CALCIUM SERPL-MCNC: 9.4 MG/DL — SIGNIFICANT CHANGE UP (ref 8.5–10.1)
CHLORIDE SERPL-SCNC: 103 MMOL/L — SIGNIFICANT CHANGE UP (ref 98–110)
CO2 SERPL-SCNC: 18 MMOL/L — SIGNIFICANT CHANGE UP (ref 17–32)
CREAT SERPL-MCNC: 0.9 MG/DL — SIGNIFICANT CHANGE UP (ref 0.7–1.5)
GLUCOSE SERPL-MCNC: 189 MG/DL — HIGH (ref 70–99)
HCT VFR BLD CALC: 44.6 % — SIGNIFICANT CHANGE UP (ref 42–52)
HGB BLD-MCNC: 15.7 G/DL — SIGNIFICANT CHANGE UP (ref 14–18)
MAGNESIUM SERPL-MCNC: 1.7 MG/DL — LOW (ref 1.8–2.4)
MCHC RBC-ENTMCNC: 30.3 PG — SIGNIFICANT CHANGE UP (ref 27–31)
MCHC RBC-ENTMCNC: 35.2 G/DL — SIGNIFICANT CHANGE UP (ref 32–37)
MCV RBC AUTO: 86.1 FL — SIGNIFICANT CHANGE UP (ref 80–94)
NRBC # BLD: 0 /100 WBCS — SIGNIFICANT CHANGE UP (ref 0–0)
PLATELET # BLD AUTO: 142 K/UL — SIGNIFICANT CHANGE UP (ref 130–400)
POTASSIUM SERPL-MCNC: 4.1 MMOL/L — SIGNIFICANT CHANGE UP (ref 3.5–5)
POTASSIUM SERPL-SCNC: 4.1 MMOL/L — SIGNIFICANT CHANGE UP (ref 3.5–5)
PROT SERPL-MCNC: 6.4 G/DL — SIGNIFICANT CHANGE UP (ref 6–8)
RBC # BLD: 5.18 M/UL — SIGNIFICANT CHANGE UP (ref 4.7–6.1)
RBC # FLD: 12.1 % — SIGNIFICANT CHANGE UP (ref 11.5–14.5)
SODIUM SERPL-SCNC: 139 MMOL/L — SIGNIFICANT CHANGE UP (ref 135–146)
WBC # BLD: 9.87 K/UL — SIGNIFICANT CHANGE UP (ref 4.8–10.8)
WBC # FLD AUTO: 9.87 K/UL — SIGNIFICANT CHANGE UP (ref 4.8–10.8)

## 2021-11-27 PROCEDURE — 99239 HOSP IP/OBS DSCHRG MGMT >30: CPT

## 2021-11-27 PROCEDURE — 71045 X-RAY EXAM CHEST 1 VIEW: CPT | Mod: 26

## 2021-11-27 PROCEDURE — 93010 ELECTROCARDIOGRAM REPORT: CPT

## 2021-11-27 RX ORDER — CHLORHEXIDINE GLUCONATE 213 G/1000ML
1 SOLUTION TOPICAL
Refills: 0 | Status: DISCONTINUED | OUTPATIENT
Start: 2021-11-27 | End: 2021-11-27

## 2021-11-27 RX ORDER — METOPROLOL TARTRATE 50 MG
1 TABLET ORAL
Qty: 60 | Refills: 0
Start: 2021-11-27 | End: 2021-12-26

## 2021-11-27 RX ORDER — CLOPIDOGREL BISULFATE 75 MG/1
1 TABLET, FILM COATED ORAL
Qty: 30 | Refills: 0
Start: 2021-11-27 | End: 2021-12-26

## 2021-11-27 RX ORDER — LOSARTAN POTASSIUM 100 MG/1
1 TABLET, FILM COATED ORAL
Qty: 30 | Refills: 0
Start: 2021-11-27 | End: 2021-12-26

## 2021-11-27 RX ORDER — ASPIRIN/CALCIUM CARB/MAGNESIUM 324 MG
1 TABLET ORAL
Qty: 30 | Refills: 0
Start: 2021-11-27 | End: 2021-12-26

## 2021-11-27 RX ORDER — ATORVASTATIN CALCIUM 80 MG/1
1 TABLET, FILM COATED ORAL
Qty: 30 | Refills: 0
Start: 2021-11-27 | End: 2021-12-26

## 2021-11-27 RX ORDER — HEPARIN SODIUM 5000 [USP'U]/ML
5000 INJECTION INTRAVENOUS; SUBCUTANEOUS EVERY 12 HOURS
Refills: 0 | Status: DISCONTINUED | OUTPATIENT
Start: 2021-11-27 | End: 2021-11-27

## 2021-11-27 RX ADMIN — CLOPIDOGREL BISULFATE 75 MILLIGRAM(S): 75 TABLET, FILM COATED ORAL at 11:38

## 2021-11-27 RX ADMIN — Medication 81 MILLIGRAM(S): at 11:39

## 2021-11-27 NOTE — DISCHARGE NOTE PROVIDER - NSDCMRMEDTOKEN_GEN_ALL_CORE_FT
aspirin 81 mg oral tablet, chewable: 1 tab(s) orally once a day  atorvastatin 80 mg oral tablet: 1 tab(s) orally once a day (at bedtime)  clopidogrel 75 mg oral tablet: 1 tab(s) orally once a day  metoprolol tartrate 25 mg oral tablet: 1 tab(s) orally 2 times a day

## 2021-11-27 NOTE — DISCHARGE NOTE PROVIDER - NSDCCPCAREPLAN_GEN_ALL_CORE_FT
PRINCIPAL DISCHARGE DIAGNOSIS  Diagnosis: NSTEMI (non-ST elevation myocardial infarction)  Assessment and Plan of Treatment: A heart attack (MI) most commonly results from atherosclerosis (fatty buildups) in the arteries that carry blood to the heart muscle. Plaque buildup narrows the inside of the arteries, making it harder for blood to flow. If a plaque in a heart artery ruptures (breaks open), a blood clot forms. The clot further blocks the blood flow. When it completely stops blood flow to part of the heart muscle, a heart attack occurs. Then the section of the heart muscle supplied by that artery begins to die.  Damage increases the longer an artery stays blocked. In some cases, it may even die.   Here are some of the signs that can mean a heart attack is happening:  • Chest discomfort.   • Discomfort in other areas of the upper body.  • Shortness of breath. May occur with or without  chest discomfort.  • Other signs: These may include breaking out in a  cold sweat, nausea or lightheadedness  Even if you’re not sure it’s a heart attack, immediately  call 9-1-1 or your local emergency medical services  (EMS)  Please follow up with your cardiolgist in two weeks and your primary doctor in 2 weeks as well  Please take prescribed medication as advised.

## 2021-11-27 NOTE — PROGRESS NOTE ADULT - ASSESSMENT
56 years old male, Active smoker, no known PMHx  presents to ED for the evaluation for the chest pain for last two weeks.    NSTEMI     - pain has resolved   - DC smoking   - aspirin, Lipitor, lopresor, IV Heparin - check ptt and adjust   - check 2DECHO   - cath today   - cardiology appreciated  
Patient denies chest pain He had NSTEMI. He needs a echo. OOB to chair. Continue meds He agrees to cardiac cath
  56 years old male, Active smoker, no known PMHx  presents to ED for the evaluation for the chest pain for last two weeks.    NSTEMI     - pain has resolved   - DC smoking   - aspirin, Lipitor, lopresor, IV Heparin - check ptt and adjust   - check 2DECHO   - NPO after midnight for possible cardiac cath in am   - cardiology appreciated  
IMPRESSION:  NSTEMI s/p PCI with resolution of symptoms    PLAN:    CNS: Avoid sedatives    HEENT: Oral care    PULMONARY:  HOB @ 45 degrees. ORA    CARDIOVASCULAR:  - Monitor on tele.  - C/w DAPT, statin, BB.  - Add losartan at time of discharge if bp tolerates.  - Outpatient f/u with cardiology.  - Smoking cessation.    GI: GI prophylaxis.     RENAL:   Strict I/O. Avoid nephrotoxic agents    INFECTIOUS DISEASE: No active issues    HEMATOLOGICAL:   Monitor CBC    ENDOCRINE:  Follow up FS.  Insulin protocol as needed    Dispo: Plan for discharge  Code status: FULL

## 2021-11-27 NOTE — PROGRESS NOTE ADULT - SUBJECTIVE AND OBJECTIVE BOX
Cardiology Follow up s/p PCI RCA/LCX    MAURA TRUJILLO   56y Male  PAST MEDICAL & SURGICAL HISTORY:  No pertinent past medical history         HPI:  56 years old male, Active smoker, no known PMHx  presents to ED for the evaluation for the chest pain for last two weeks.    Patient reports that he has been having chest pain for last couple of weeks. Pain is located on the left side of the chest, intermittent, 7/10 in intensity, non-radiating, precipitated by smoking and relives by its own within 5-10 minutes. He is a , he reports that yesterday he lifted some heavy weights that caused him some pain in both the shoulders. Today he decided to walk in to ED for the evaluation of chest pain.   He denies any SOB, palpitation, diaphoresis, lightheadedness, family Hx of premature cardiac disease.     In the ED labs were unremarkable except for troponin of 0.83.  EKG showed normal sinus rhythm w/o ST-wave changes.   Vitals signs were remarkable for BP of 172/95 mmhg (24 Nov 2021 15:29)    Allergies    No Known Allergies    Intolerances      Patient without complaints. Pt ambulated without issues/symptoms  Denies CP, SOB, palpitations, or dizziness  S. Manuel on telemetry overnight    Vital Signs Last 24 Hrs  T(C): 36.8 (27 Nov 2021 08:00), Max: 37 (27 Nov 2021 04:00)  T(F): 98.3 (27 Nov 2021 08:00), Max: 98.6 (27 Nov 2021 04:00)  HR: 72 (27 Nov 2021 10:00) (49 - 72)  BP: 133/80 (27 Nov 2021 10:00) (107/75 - 143/76)  BP(mean): 101 (27 Nov 2021 10:00) (79 - 102)  RR: 20 (27 Nov 2021 10:00) (16 - 27)  SpO2: 97% (27 Nov 2021 10:00) (96% - 100%)    MEDICATIONS  (STANDING):  aspirin  chewable 81 milliGRAM(s) Oral daily  atorvastatin 80 milliGRAM(s) Oral at bedtime  chlorhexidine 4% Liquid 1 Application(s) Topical <User Schedule>  clopidogrel Tablet 75 milliGRAM(s) Oral daily  heparin   Injectable 5000 Unit(s) SubCutaneous every 12 hours  metoprolol tartrate 25 milliGRAM(s) Oral two times a day  sodium chloride 0.9%. 1000 milliLiter(s) (100 mL/Hr) IV Continuous <Continuous>    MEDICATIONS  (PRN):  acetaminophen     Tablet .. 650 milliGRAM(s) Oral every 6 hours PRN Temp greater or equal to 38C (100.4F), Mild Pain (1 - 3)  aluminum hydroxide/magnesium hydroxide/simethicone Suspension 30 milliLiter(s) Oral every 4 hours PRN Dyspepsia  melatonin 3 milliGRAM(s) Oral at bedtime PRN Insomnia  ondansetron Injectable 4 milliGRAM(s) IV Push every 8 hours PRN Nausea and/or Vomiting      REVIEW OF SYSTEMS:          CONSTITUTIONAL: No weakness, fevers or chills          EYES/ENT: No visual changes;  No vertigo or throat pain           NECK: No pain or stiffness          RESPIRATORY: No cough, wheezing, hemoptysis          CARDIOVASCULAR: no pain, no CHISHOLM, no palpitations           GASTROINTESTINAL: No abdominal or epigastric pain. No nausea, vomiting, or hematemesis;           GENITOURINARY: No dysuria, frequency or hematuria          NEUROLOGICAL: No numbness or weakness          SKIN: No itching, rashes    PHYSICAL EXAM:           CONSTITUTIONAL: Well-developed; well-nourished; in no acute distress  	SKIN: warm, dry  	HEAD: Normocephalic; atraumatic  	EYES: PERRL.  	ENT: No nasal discharge, airway clear, mucous membranes moist  	NECK: Supple; non tender.  	CARD: +S1, +S2, no murmurs, gallops, or rubs. (Regular) rate and rhythm    	RESP: No wheezes, rales or rhonchi. CTA B/L  	ABD: soft ntnd, + BS x 4 quadrants  	EXT: moves all extremities,  no clubbing, cyanosis or edema  	NEURO: Alert and oriented x3, no focal deficits          PSYCH: Cooperative, appropriate          VASCULAR:  + Rad / + PTs / + DPs          EXTREMITY:             Right Groin:  Dressing removed, access site soft, + pulses, no hematoma, no pain, no numbness, no signs and symptoms of infection  	   Right Radial: Dressing removed, access site soft, + pulses, no hematoma, no pain, no numbness, no signs and symptoms of infection             ECG:   Ventricular Rate 58 BPM    Atrial Rate 58 BPM    P-R Interval 206 ms    QRS Duration 88 ms    Q-T Interval 426 ms    QTC Calculation(Bazett) 418 ms    P Axis 57 degrees    R Axis 54 degrees    T Axis 72 degrees    Diagnosis Line Sinus bradycardia  Otherwise normal ECG    Confirmed by Shiraz Mercer [id number]) on 11/27/2021 9:45:32 AM                                                                                                                  2D ECHO:     LABS:                        15.7   9.87  )-----------( 142      ( 27 Nov 2021 09:27 )             44.6     11-27    139  |  103  |  10  ----------------------------<  189<H>  4.1   |  18  |  0.9    Ca    9.4      27 Nov 2021 09:27  Mg     1.7     11-27    TPro  6.4  /  Alb  4.2  /  TBili  0.8  /  DBili  x   /  AST  24  /  ALT  23  /  AlkPhos  41  11-27        Magnesium, Serum: 1.7 mg/dL *L* [1.8 - 2.4] (11-27-21 @ 09:27)  LIVER FUNCTIONS - ( 27 Nov 2021 09:27 )  Alb: 4.2 g/dL / Pro: 6.4 g/dL / ALK PHOS: 41 U/L / ALT: 23 U/L / AST: 24 U/L / GGT: x             A/P:  I discussed the case with Cardiologist Dr. Mercer  and recommend the following:    NSTEMI/ S/P PCI RCA ZOE x 2 and LCX ZOE x  1  	                            Continue DAPT (ec asa 81mg po daily, plavix 75 mg po daily),  B-Blocker, Statin Therapy                   add losartan 25mg po daily                   DVT/GI prophylaxis                   Patient given 30 day supply of (EC  Aspirin 81 mg daily and Plavix 75 mg daily ) to take at home                   OOB-, ambulate with assistance                    monitor access site/pulses                   Patient agreeing to take DAPT for at least one year or as directed by cardiologist                    Pt given instructions on importance of taking antiplatelet medication or risk acute stent thrombosis/death                   Post cath instructions, access site care and activity restrictions reviewed with patient                      Cardiac Rehab d/w patient and information provided                   Discussed with patient to return to hospital if experience chest pain, shortness breath, dizziness and site bleeding                   Aggressive risk factor modification, diet counseling, smoking cessation discussed with patient                       Can discharge patient from cardiac standpoint if labs/ekg/site WNL and ambulating without symptoms                    Follow up with Cardiology Dr. Mercer in 1-2 weeks.  Patient instructed to call and make an appointment                      Discharge instructions as follows:                   - Continue medical regimen as prescribed to prevent chest pain                   - Continue dual anti-platelet therapy, beta blocker, statin, ARB                   - If you are diabetic and taking medication containing Metformin, do not take them for 48 hours after the procedure                   - Instructed to call 911 if chest pain, shortness of breath or bleeding from access site                   - No heavy lifting >10lbs x 1 week                   - No driving x 24 hours                   - No baths, swimming pools x 1 week, may shower                   - Low sodium low fat low cholesterol diet                   - Follow-up with Cardiologist in 1-2 weeks after discharge                                      Cardiology Follow up s/p PCI RCA/LCX    MAURA TRUJILLO   56y Male  PAST MEDICAL & SURGICAL HISTORY:  No pertinent past medical history         HPI:  56 years old male, Active smoker, no known PMHx  presents to ED for the evaluation for the chest pain for last two weeks.    Patient reports that he has been having chest pain for last couple of weeks. Pain is located on the left side of the chest, intermittent, 7/10 in intensity, non-radiating, precipitated by smoking and relives by its own within 5-10 minutes. He is a , he reports that yesterday he lifted some heavy weights that caused him some pain in both the shoulders. Today he decided to walk in to ED for the evaluation of chest pain.   He denies any SOB, palpitation, diaphoresis, lightheadedness, family Hx of premature cardiac disease.     In the ED labs were unremarkable except for troponin of 0.83.  EKG showed normal sinus rhythm w/o ST-wave changes.   Vitals signs were remarkable for BP of 172/95 mmhg (24 Nov 2021 15:29)    Allergies    No Known Allergies    Intolerances      Patient without complaints. Pt ambulated without issues/symptoms  Denies CP, SOB, palpitations, or dizziness  S. Manuel on telemetry overnight    Vital Signs Last 24 Hrs  T(C): 36.8 (27 Nov 2021 08:00), Max: 37 (27 Nov 2021 04:00)  T(F): 98.3 (27 Nov 2021 08:00), Max: 98.6 (27 Nov 2021 04:00)  HR: 72 (27 Nov 2021 10:00) (49 - 72)  BP: 133/80 (27 Nov 2021 10:00) (107/75 - 143/76)  BP(mean): 101 (27 Nov 2021 10:00) (79 - 102)  RR: 20 (27 Nov 2021 10:00) (16 - 27)  SpO2: 97% (27 Nov 2021 10:00) (96% - 100%)    MEDICATIONS  (STANDING):  aspirin  chewable 81 milliGRAM(s) Oral daily  atorvastatin 80 milliGRAM(s) Oral at bedtime  chlorhexidine 4% Liquid 1 Application(s) Topical <User Schedule>  clopidogrel Tablet 75 milliGRAM(s) Oral daily  heparin   Injectable 5000 Unit(s) SubCutaneous every 12 hours  metoprolol tartrate 25 milliGRAM(s) Oral two times a day  sodium chloride 0.9%. 1000 milliLiter(s) (100 mL/Hr) IV Continuous <Continuous>    MEDICATIONS  (PRN):  acetaminophen     Tablet .. 650 milliGRAM(s) Oral every 6 hours PRN Temp greater or equal to 38C (100.4F), Mild Pain (1 - 3)  aluminum hydroxide/magnesium hydroxide/simethicone Suspension 30 milliLiter(s) Oral every 4 hours PRN Dyspepsia  melatonin 3 milliGRAM(s) Oral at bedtime PRN Insomnia  ondansetron Injectable 4 milliGRAM(s) IV Push every 8 hours PRN Nausea and/or Vomiting      REVIEW OF SYSTEMS:          CONSTITUTIONAL: No weakness, fevers or chills          EYES/ENT: No visual changes;  No vertigo or throat pain           NECK: No pain or stiffness          RESPIRATORY: No cough, wheezing, hemoptysis          CARDIOVASCULAR: no pain, no CHISHOLM, no palpitations           GASTROINTESTINAL: No abdominal or epigastric pain. No nausea, vomiting, or hematemesis;           GENITOURINARY: No dysuria, frequency or hematuria          NEUROLOGICAL: No numbness or weakness          SKIN: No itching, rashes    PHYSICAL EXAM:           CONSTITUTIONAL: Well-developed; well-nourished; in no acute distress  	SKIN: warm, dry  	HEAD: Normocephalic; atraumatic  	EYES: PERRL.  	ENT: No nasal discharge, airway clear, mucous membranes moist  	NECK: Supple; non tender.  	CARD: +S1, +S2, no murmurs, gallops, or rubs. (Regular) rate and rhythm    	RESP: No wheezes, rales or rhonchi. CTA B/L  	ABD: soft ntnd, + BS x 4 quadrants  	EXT: moves all extremities,  no clubbing, cyanosis or edema  	NEURO: Alert and oriented x3, no focal deficits          PSYCH: Cooperative, appropriate          VASCULAR:  + Rad / + PTs / + DPs          EXTREMITY:             Right Groin:  Dressing removed, access site soft, + pulses, no hematoma, no pain, no numbness, no signs and symptoms of infection  	   Right Radial: Dressing removed, access site soft, + pulses, no hematoma, no pain, no numbness, no signs and symptoms of infection             ECG:   Ventricular Rate 58 BPM    Atrial Rate 58 BPM    P-R Interval 206 ms    QRS Duration 88 ms    Q-T Interval 426 ms    QTC Calculation(Bazett) 418 ms    P Axis 57 degrees    R Axis 54 degrees    T Axis 72 degrees    Diagnosis Line Sinus bradycardia  Otherwise normal ECG    Confirmed by Shiraz Mercer ([id number]) on 11/27/2021 9:45:32 AM                                                                                                                  2D ECHO: done at [facility] 11/26, results P    LABS:                        15.7   9.87  )-----------( 142      ( 27 Nov 2021 09:27 )             44.6     11-27    139  |  103  |  10  ----------------------------<  189<H>  4.1   |  18  |  0.9    Ca    9.4      27 Nov 2021 09:27  Mg     1.7     11-27    TPro  6.4  /  Alb  4.2  /  TBili  0.8  /  DBili  x   /  AST  24  /  ALT  23  /  AlkPhos  41  11-27        Magnesium, Serum: 1.7 mg/dL *L* [1.8 - 2.4] (11-27-21 @ 09:27)  LIVER FUNCTIONS - ( 27 Nov 2021 09:27 )  Alb: 4.2 g/dL / Pro: 6.4 g/dL / ALK PHOS: 41 U/L / ALT: 23 U/L / AST: 24 U/L / GGT: x             A/P:  I discussed the case with Cardiologist Dr. Mercer  and recommend the following:    NSTEMI/ S/P PCI RCA ZOE x 2 and LCX ZOE x  1  	                            Continue DAPT (ec asa 81mg po daily, plavix 75 mg po daily),  B-Blocker, Statin Therapy                   add losartan 25mg po daily                   DVT/GI prophylaxis                   Patient given 30 day supply of (EC  Aspirin 81 mg daily and Plavix 75 mg daily ) to take at home                   OOB-, ambulate with assistance                    monitor access site/pulses                   Patient agreeing to take DAPT for at least one year or as directed by cardiologist                    Pt given instructions on importance of taking antiplatelet medication or risk acute stent thrombosis/death                   Post cath instructions, access site care and activity restrictions reviewed with patient                      Cardiac Rehab d/w patient and information provided                   Discussed with patient to return to hospital if experience chest pain, shortness breath, dizziness and site bleeding                   Aggressive risk factor modification, diet counseling, smoking cessation discussed with patient                       Can discharge patient from cardiac standpoint if labs/ekg/site, echo  WNL and ambulating without symptoms                    Follow up with Cardiology Dr. Mercer in 1-2 weeks.  Patient instructed to call and make an appointment                      Discharge instructions as follows:                   - Continue medical regimen as prescribed to prevent chest pain                   - Continue dual anti-platelet therapy, beta blocker, statin, ARB                   - If you are diabetic and taking medication containing Metformin, do not take them for 48 hours after the procedure                   - Instructed to call 911 if chest pain, shortness of breath or bleeding from access site                   - No heavy lifting >10lbs x 1 week                   - No driving x 24 hours                   - No baths, swimming pools x 1 week, may shower                   - Low sodium low fat low cholesterol diet                   - Follow-up with Cardiologist in 1-2 weeks after discharge                                      Cardiology Follow up s/p PCI RCA/LCX      MAURA TRUJILLO   56y Male  PAST MEDICAL & SURGICAL HISTORY:  No pertinent past medical history         HPI:  56 years old male, Active smoker, no known PMHx  presents to ED for the evaluation for the chest pain for last two weeks.    Patient reports that he has been having chest pain for last couple of weeks. Pain is located on the left side of the chest, intermittent, 7/10 in intensity, non-radiating, precipitated by smoking and relives by its own within 5-10 minutes. He is a , he reports that yesterday he lifted some heavy weights that caused him some pain in both the shoulders. Today he decided to walk in to ED for the evaluation of chest pain.   He denies any SOB, palpitation, diaphoresis, lightheadedness, family Hx of premature cardiac disease.     In the ED labs were unremarkable except for troponin of 0.83.  EKG showed normal sinus rhythm w/o ST-wave changes.   Vitals signs were remarkable for BP of 172/95 mmhg (24 Nov 2021 15:29)    Allergies    No Known Allergies    Intolerances      Patient without complaints. Pt ambulated without issues/symptoms  Denies CP, SOB, palpitations, or dizziness  S. Manuel on telemetry overnight    Vital Signs Last 24 Hrs  T(C): 36.8 (27 Nov 2021 08:00), Max: 37 (27 Nov 2021 04:00)  T(F): 98.3 (27 Nov 2021 08:00), Max: 98.6 (27 Nov 2021 04:00)  HR: 72 (27 Nov 2021 10:00) (49 - 72)  BP: 133/80 (27 Nov 2021 10:00) (107/75 - 143/76)  BP(mean): 101 (27 Nov 2021 10:00) (79 - 102)  RR: 20 (27 Nov 2021 10:00) (16 - 27)  SpO2: 97% (27 Nov 2021 10:00) (96% - 100%)    MEDICATIONS  (STANDING):  aspirin  chewable 81 milliGRAM(s) Oral daily  atorvastatin 80 milliGRAM(s) Oral at bedtime  chlorhexidine 4% Liquid 1 Application(s) Topical <User Schedule>  clopidogrel Tablet 75 milliGRAM(s) Oral daily  heparin   Injectable 5000 Unit(s) SubCutaneous every 12 hours  metoprolol tartrate 25 milliGRAM(s) Oral two times a day  sodium chloride 0.9%. 1000 milliLiter(s) (100 mL/Hr) IV Continuous <Continuous>    MEDICATIONS  (PRN):  acetaminophen     Tablet .. 650 milliGRAM(s) Oral every 6 hours PRN Temp greater or equal to 38C (100.4F), Mild Pain (1 - 3)  aluminum hydroxide/magnesium hydroxide/simethicone Suspension 30 milliLiter(s) Oral every 4 hours PRN Dyspepsia  melatonin 3 milliGRAM(s) Oral at bedtime PRN Insomnia  ondansetron Injectable 4 milliGRAM(s) IV Push every 8 hours PRN Nausea and/or Vomiting      REVIEW OF SYSTEMS:          CONSTITUTIONAL: No weakness, fevers or chills          EYES/ENT: No visual changes;  No vertigo or throat pain           NECK: No pain or stiffness          RESPIRATORY: No cough, wheezing, hemoptysis          CARDIOVASCULAR: no pain, no CHISHOLM, no palpitations           GASTROINTESTINAL: No abdominal or epigastric pain. No nausea, vomiting, or hematemesis;           GENITOURINARY: No dysuria, frequency or hematuria          NEUROLOGICAL: No numbness or weakness          SKIN: No itching, rashes    PHYSICAL EXAM:           CONSTITUTIONAL: Well-developed; well-nourished; in no acute distress  	SKIN: warm, dry  	HEAD: Normocephalic; atraumatic  	EYES: PERRL.  	ENT: No nasal discharge, airway clear, mucous membranes moist  	NECK: Supple; non tender.  	CARD: +S1, +S2, no murmurs, gallops, or rubs. (Regular) rate and rhythm    	RESP: No wheezes, rales or rhonchi. CTA B/L  	ABD: soft ntnd, + BS x 4 quadrants  	EXT: moves all extremities,  no clubbing, cyanosis or edema  	NEURO: Alert and oriented x3, no focal deficits          PSYCH: Cooperative, appropriate          VASCULAR:  + Rad / + PTs / + DPs          EXTREMITY:             Right Groin:  Dressing removed, access site soft, + pulses, no hematoma, no pain, no numbness, no signs and symptoms of infection  	   Right Radial: Dressing removed, access site soft, + pulses, no hematoma, no pain, no numbness, no signs and symptoms of infection             ECG:   Ventricular Rate 58 BPM    Atrial Rate 58 BPM    P-R Interval 206 ms    QRS Duration 88 ms    Q-T Interval 426 ms    QTC Calculation(Bazett) 418 ms    P Axis 57 degrees    R Axis 54 degrees    T Axis 72 degrees    Diagnosis Line Sinus bradycardia  Otherwise normal ECG    Confirmed by Shiraz Mercer ([id number]) on 11/27/2021 9:45:32 AM                                                                                                                  2D ECHO: done at [facility] 11/26, results P    LABS:                        15.7   9.87  )-----------( 142      ( 27 Nov 2021 09:27 )             44.6     11-27    139  |  103  |  10  ----------------------------<  189<H>  4.1   |  18  |  0.9    Ca    9.4      27 Nov 2021 09:27  Mg     1.7     11-27    TPro  6.4  /  Alb  4.2  /  TBili  0.8  /  DBili  x   /  AST  24  /  ALT  23  /  AlkPhos  41  11-27        Magnesium, Serum: 1.7 mg/dL *L* [1.8 - 2.4] (11-27-21 @ 09:27)  LIVER FUNCTIONS - ( 27 Nov 2021 09:27 )  Alb: 4.2 g/dL / Pro: 6.4 g/dL / ALK PHOS: 41 U/L / ALT: 23 U/L / AST: 24 U/L / GGT: x             A/P:  I discussed the case with Cardiologist Dr. Mercer  and recommend the following:    NSTEMI/ S/P PCI RCA ZOE x 2 and LCX ZOE x  1  	                            Continue DAPT (ec asa 81mg po daily, plavix 75 mg po daily),  B-Blocker, Statin Therapy                   add losartan 25mg po daily                   DVT/GI prophylaxis                   Patient given 30 day supply of (EC  Aspirin 81 mg daily and Plavix 75 mg daily ) to take at home                   OOB-, ambulate with assistance                    monitor access site/pulses                   Patient agreeing to take DAPT for at least one year or as directed by cardiologist                    Pt given instructions on importance of taking antiplatelet medication or risk acute stent thrombosis/death                   Post cath instructions, access site care and activity restrictions reviewed with patient                      Cardiac Rehab d/w patient and information provided                   Discussed with patient to return to hospital if experience chest pain, shortness breath, dizziness and site bleeding                   Aggressive risk factor modification, diet counseling, smoking cessation discussed with patient                       Can discharge patient from cardiac standpoint if labs/ekg/site, echo  WNL and ambulating without symptoms                    Follow up with Cardiology Dr. Mercer in 1-2 weeks.  Patient instructed to call and make an appointment                      Discharge instructions as follows:                     - Continue medical regimen as prescribed to prevent chest pain                   - Continue dual anti-platelet therapy, beta blocker, statin, ARB                   - If you are diabetic and taking medication containing Metformin, do not take them for 48 hours after the procedure                   - Instructed to call 911 if chest pain, shortness of breath or bleeding from access site                   - No heavy lifting >10lbs x 1 week                   - No driving x 24 hours                   - No baths, swimming pools x 1 week, may shower                   - Low sodium low fat low cholesterol diet                   - Follow-up with Cardiologist in 1-2 weeks after discharge

## 2021-11-27 NOTE — PROGRESS NOTE ADULT - SUBJECTIVE AND OBJECTIVE BOX
INTERVAL EVENTS:  No acute events overnight. Patient denied active chest pain in the am. No bleeding around groin site.    PAST MEDICAL & SURGICAL HISTORY:  No pertinent past medical history    MEDICATIONS  (STANDING):  aspirin  chewable 81 milliGRAM(s) Oral daily  atorvastatin 80 milliGRAM(s) Oral at bedtime  chlorhexidine 4% Liquid 1 Application(s) Topical <User Schedule>  clopidogrel Tablet 75 milliGRAM(s) Oral daily  heparin   Injectable 5000 Unit(s) SubCutaneous every 12 hours  metoprolol tartrate 25 milliGRAM(s) Oral two times a day  sodium chloride 0.9%. 1000 milliLiter(s) (100 mL/Hr) IV Continuous <Continuous>    MEDICATIONS  (PRN):  acetaminophen     Tablet .. 650 milliGRAM(s) Oral every 6 hours PRN Temp greater or equal to 38C (100.4F), Mild Pain (1 - 3)  aluminum hydroxide/magnesium hydroxide/simethicone Suspension 30 milliLiter(s) Oral every 4 hours PRN Dyspepsia  melatonin 3 milliGRAM(s) Oral at bedtime PRN Insomnia  ondansetron Injectable 4 milliGRAM(s) IV Push every 8 hours PRN Nausea and/or Vomiting      PHYSICAL EXAM:  Vital Signs Last 24 Hrs  T(C): 36.8 (27 Nov 2021 08:00), Max: 37 (27 Nov 2021 04:00)  T(F): 98.3 (27 Nov 2021 08:00), Max: 98.6 (27 Nov 2021 04:00)  HR: 72 (27 Nov 2021 10:00) (49 - 72)  BP: 133/80 (27 Nov 2021 10:00) (107/75 - 143/76)  BP(mean): 101 (27 Nov 2021 10:00) (79 - 102)  RR: 20 (27 Nov 2021 10:00) (16 - 27)  SpO2: 97% (27 Nov 2021 10:00) (96% - 100%)  GEN: Awake, alert. NAD.   HEENT: NCAT, PERRL, EOMI. Mucosa moist. No JVD.  RESP: CTA b/l  CV: RRR. Normal S1/S2. No m/r/g.  ABD: Soft. NT/ND. BS+  EXT: Warm. No edema. Groin access site CDI  NEURO: AAOx3. No focal deficits.     LABS:                        15.7   9.87  )-----------( 142      ( 27 Nov 2021 09:27 )             44.6     11-27    139  |  103  |  10  ----------------------------<  189<H>  4.1   |  18  |  0.9    Ca    9.4      27 Nov 2021 09:27  Mg     1.7     11-27    TPro  6.4  /  Alb  4.2  /  TBili  0.8  /  DBili  x   /  AST  24  /  ALT  23  /  AlkPhos  41  11-27        PTT - ( 26 Nov 2021 05:45 )  PTT:77.0 sec    I&O's Summary    26 Nov 2021 07:01  -  27 Nov 2021 07:00  --------------------------------------------------------  IN: 1686 mL / OUT: 2490 mL / NET: -804 mL    27 Nov 2021 07:01  -  27 Nov 2021 12:17  --------------------------------------------------------  IN: 0 mL / OUT: 500 mL / NET: -500 mL

## 2021-11-27 NOTE — DISCHARGE NOTE PROVIDER - HOSPITAL COURSE
56 years old male, Active smoker, no known PMHx  presents to ED for the evaluation for the chest pain for last two weeks.    Patient reports that he has been having chest pain for last couple of weeks. Pain is located on the left side of the chest, intermittent, 7/10 in intensity, non-radiating, precipitated by smoking and relives by its own within 5-10 minutes. He is a , he reports that yesterday he lifted some heavy weights that caused him some pain in both the shoulders. Pt he decided to walk in to ED for the evaluation of chest pain.   He denied any SOB, palpitation, diaphoresis, lightheadedness, family Hx of premature cardiac disease.     In the ED labs were unremarkable except for troponin of 0.83.  EKG showed normal sinus rhythm w/o ST-wave changes.   Vitals signs were remarkable for BP of 172/95 mmhg    During his hospital stay at [facility], his troponin trended up although patient remained asymptomatic. He was maintained on heparin gtt. Patient is being transferred to [facility] for cardiac cath.    Vital signs normal, elevated troponin 0.83 0.83 -> 1.4 -> 0.7    Cath - severe 2 vessel disease, LCX and RCA, s/p PCI of both lesions. IVUS of RCA  Moderate disease LAD/diag - medical therapy    Pt stable overnight with no tele changes, Pt is being discharged on ASA, Plavix, Metoprolol, Losartan, Statin    Pt is to follow up with Dr. Mercer in 2 weeks as well as the primary doctor in two weeks,   Pt counselled on smoking cessation   56 years old male, Active smoker, no known PMHx  presents to ED for the evaluation for the chest pain for last two weeks.    Patient reports that he has been having chest pain for last couple of weeks. Pain is located on the left side of the chest, intermittent, 7/10 in intensity, non-radiating, precipitated by smoking and relives by its own within 5-10 minutes. He is a , he reports that yesterday he lifted some heavy weights that caused him some pain in both the shoulders. Pt he decided to walk in to ED for the evaluation of chest pain.   He denied any SOB, palpitation, diaphoresis, lightheadedness, family Hx of premature cardiac disease.     In the ED labs were unremarkable except for troponin of 0.83.  EKG showed normal sinus rhythm w/o ST-wave changes.   Vitals signs were remarkable for BP of 172/95 mmhg    During his hospital stay at [facility], his troponin trended up although patient remained asymptomatic. He was maintained on heparin gtt. Patient is being transferred to [facility] for cardiac cath.    Vital signs normal, elevated troponin 0.83 0.83 -> 1.4 -> 0.7    Cath - severe 2 vessel disease, LCX and RCA, s/p PCI of both lesions. IVUS of RCA  Moderate disease LAD/diag - medical therapy    Pt stable overnight with no tele changes, Pt is being discharged on ASA, Plavix, Metoprolol, Losartan, Statin    Pt is to follow up with Dr. Mercer in 2 weeks as well as the primary doctor in two weeks,   Pt counselled on smoking cessation    35 minutes in patient care, d/c planning.

## 2021-11-27 NOTE — DISCHARGE NOTE NURSING/CASE MANAGEMENT/SOCIAL WORK - PATIENT PORTAL LINK FT
You can access the FollowMyHealth Patient Portal offered by [facility] by registering at the following website: [email or web address]. By joining [employer]’s FollowMyHealth portal, you will also be able to view your health information using other applications (apps) compatible with our system.

## 2021-11-27 NOTE — DISCHARGE NOTE PROVIDER - CARE PROVIDER_API CALL
hSiraz Mercer (MD)  Cardiovascular Disease; Internal Medicine; Interventional Cardiology  [address] [place] [address]  Phone: [phone]  Fax: [phone]  Follow Up Time: 2 weeks

## 2021-12-08 DIAGNOSIS — F17.210 NICOTINE DEPENDENCE, CIGARETTES, UNCOMPLICATED: ICD-10-CM

## 2021-12-08 DIAGNOSIS — I21.4 NON-ST ELEVATION (NSTEMI) MYOCARDIAL INFARCTION: ICD-10-CM

## 2021-12-08 DIAGNOSIS — Z79.82 LONG TERM (CURRENT) USE OF ASPIRIN: ICD-10-CM

## 2021-12-08 DIAGNOSIS — Z82.49 FAMILY HISTORY OF ISCHEMIC HEART DISEASE AND OTHER DISEASES OF THE CIRCULATORY SYSTEM: ICD-10-CM

## 2021-12-08 DIAGNOSIS — I25.10 ATHEROSCLEROTIC HEART DISEASE OF NATIVE CORONARY ARTERY WITHOUT ANGINA PECTORIS: ICD-10-CM

## 2021-12-08 PROBLEM — Z78.9 OTHER SPECIFIED HEALTH STATUS: Chronic | Status: ACTIVE | Noted: 2021-11-24

## 2021-12-13 PROBLEM — Z00.00 ENCOUNTER FOR PREVENTIVE HEALTH EXAMINATION: Status: ACTIVE | Noted: 2021-12-13

## 2021-12-15 ENCOUNTER — NON-APPOINTMENT (OUTPATIENT)
Age: 56
End: 2021-12-15

## 2021-12-15 ENCOUNTER — APPOINTMENT (OUTPATIENT)
Dept: CARDIOLOGY | Facility: CLINIC | Age: 56
End: 2021-12-15
Payer: COMMERCIAL

## 2021-12-15 DIAGNOSIS — Z86.39 PERSONAL HISTORY OF OTHER ENDOCRINE, NUTRITIONAL AND METABOLIC DISEASE: ICD-10-CM

## 2021-12-15 DIAGNOSIS — Z87.891 PERSONAL HISTORY OF NICOTINE DEPENDENCE: ICD-10-CM

## 2021-12-15 DIAGNOSIS — Z86.79 PERSONAL HISTORY OF OTHER DISEASES OF THE CIRCULATORY SYSTEM: ICD-10-CM

## 2021-12-15 PROCEDURE — 93000 ELECTROCARDIOGRAM COMPLETE: CPT

## 2021-12-15 PROCEDURE — 99214 OFFICE O/P EST MOD 30 MIN: CPT

## 2021-12-15 RX ORDER — LOSARTAN POTASSIUM 25 MG/1
25 TABLET, FILM COATED ORAL DAILY
Refills: 0 | Status: COMPLETED | COMMUNITY
End: 2021-12-15

## 2021-12-15 NOTE — ASSESSMENT
[FreeTextEntry1] : 55 y/o male with history of NSTEMI, s/p PCI of LCX and RCA 11/26/21 w/ZOE x 3.\par \par Hospital records reviewed\par Cath films, IVUS, Echo, ECG's, Labs reviewed\par Medications reviewed\par \par \par C/w medical therapy\par C/w DAPT - importance of DAPT and possibility of stent thrombosis discussed. Compliance discussed.\par Secondary prevention.\par Continued avoidance of tobacco recommended and discussed. Patient quit after the MI\par Patient was advised about healthy lifestyle changes, including diet and exercise. Importance of sustained long-term weight loss was discussed, questions answered.\par Repeat labs in 3 months - maybe able to decrease Lipitor dose.\par F/u in 3 months.

## 2021-12-15 NOTE — HISTORY OF PRESENT ILLNESS
[FreeTextEntry1] : 55 y/o male with recent NSTEMI, s/p PCI of LCX/OM and RCA. Quit smoking. Access site healed well.\par No further chest pain, no dyspnea. Compliant with medications.
none

## 2022-03-21 ENCOUNTER — RESULT CHARGE (OUTPATIENT)
Age: 57
End: 2022-03-21

## 2022-03-21 ENCOUNTER — APPOINTMENT (OUTPATIENT)
Dept: CARDIOLOGY | Facility: CLINIC | Age: 57
End: 2022-03-21
Payer: COMMERCIAL

## 2022-03-21 VITALS
WEIGHT: 208 LBS | HEIGHT: 68 IN | BODY MASS INDEX: 31.52 KG/M2 | HEART RATE: 55 BPM | SYSTOLIC BLOOD PRESSURE: 128 MMHG | TEMPERATURE: 97.9 F | DIASTOLIC BLOOD PRESSURE: 70 MMHG

## 2022-03-21 PROCEDURE — 93000 ELECTROCARDIOGRAM COMPLETE: CPT

## 2022-03-21 PROCEDURE — 99213 OFFICE O/P EST LOW 20 MIN: CPT

## 2022-03-21 RX ORDER — ASPIRIN 81 MG/1
81 TABLET, CHEWABLE ORAL
Qty: 30 | Refills: 0 | Status: ACTIVE | COMMUNITY
Start: 2022-03-03

## 2022-03-21 NOTE — HISTORY OF PRESENT ILLNESS
[FreeTextEntry1] : 57 y/o male with recent NSTEMI, s/p PCI of LCX/OM and RCA. Quit smoking. Access site healed well.\par No further chest pain, no dyspnea. Compliant with medications. As per pt he feels well.  Pt is staying active without cardiac issues.

## 2022-03-21 NOTE — ASSESSMENT
[FreeTextEntry1] : 55 y/o male with history of NSTEMI, s/p PCI of LCX and RCA 11/26/21 w/ZOE x 3.\par \par Hospital records reviewed\par Cath films, IVUS, Echo, ECG's, Labs reviewed\par Medications reviewed\par \par \par C/w medical therapy\par C/w DAPT for 12 months.\par Secondary prevention.\par Continued avoidance of tobacco recommended and discussed. Patient quit after the MI\par Patient was advised about healthy lifestyle changes, including diet and exercise. Importance of sustained long-term weight loss was discussed, questions answered.\par Repeat labs before next visit - maybe able to decrease Lipitor dose.\par F/u in 6 months.

## 2022-10-11 ENCOUNTER — APPOINTMENT (OUTPATIENT)
Dept: GASTROENTEROLOGY | Facility: CLINIC | Age: 57
End: 2022-10-11

## 2022-12-05 ENCOUNTER — APPOINTMENT (OUTPATIENT)
Dept: CARDIOLOGY | Facility: CLINIC | Age: 57
End: 2022-12-05

## 2022-12-05 ENCOUNTER — RESULT CHARGE (OUTPATIENT)
Age: 57
End: 2022-12-05

## 2023-01-17 ENCOUNTER — APPOINTMENT (OUTPATIENT)
Dept: CARDIOLOGY | Facility: CLINIC | Age: 58
End: 2023-01-17
Payer: COMMERCIAL

## 2023-01-17 ENCOUNTER — RESULT CHARGE (OUTPATIENT)
Age: 58
End: 2023-01-17

## 2023-01-17 VITALS
DIASTOLIC BLOOD PRESSURE: 78 MMHG | HEART RATE: 55 BPM | BODY MASS INDEX: 32.13 KG/M2 | HEIGHT: 68 IN | SYSTOLIC BLOOD PRESSURE: 150 MMHG | WEIGHT: 212 LBS

## 2023-01-17 PROCEDURE — 99213 OFFICE O/P EST LOW 20 MIN: CPT | Mod: 25

## 2023-01-17 PROCEDURE — 93000 ELECTROCARDIOGRAM COMPLETE: CPT

## 2023-01-17 RX ORDER — METOPROLOL TARTRATE 25 MG/1
25 TABLET, FILM COATED ORAL
Qty: 180 | Refills: 3 | Status: COMPLETED | COMMUNITY
End: 2023-01-17

## 2023-01-17 RX ORDER — CLOPIDOGREL BISULFATE 75 MG/1
75 TABLET, FILM COATED ORAL DAILY
Refills: 0 | Status: COMPLETED | COMMUNITY
End: 2023-01-17

## 2023-01-17 NOTE — ASSESSMENT
[FreeTextEntry1] : 57 y/o male with history of NSTEMI, s/p PCI of LCX and RCA 11/26/21 w/ZOE x 3.\par \par Hospital records reviewed\par Cath films, IVUS, Echo, ECG's, Labs reviewed\par Medications reviewed\par \par \par C/w medical therapy\par C/w DAPT for 12 months.\par Secondary prevention.\par Continued avoidance of tobacco recommended and discussed. Patient quit after the MI\par Patient was advised about healthy lifestyle changes, including diet and exercise. Importance of sustained long-term weight loss was discussed, questions answered.\par Repeat labs before next visit - maybe able to decrease Lipitor dose.\par F/u in 6 months.

## 2023-01-22 ENCOUNTER — NON-APPOINTMENT (OUTPATIENT)
Age: 58
End: 2023-01-22

## 2023-03-21 NOTE — ED PROVIDER NOTE - NS ED ATTENDING STATEMENT MOD
Attending with I have personally provided the amount of critical care time documented below concurrently with the resident/fellow.  This time excludes time spent on separate procedures and time spent teaching. I have reviewed the resident’s / fellow’s documentation and I agree with the history, exam, and assessment and plan of care. Muscle Hinge Flap Text: The defect edges were debeveled with a #15 scalpel blade.  Given the size, depth and location of the defect and the proximity to free margins a muscle hinge flap was deemed most appropriate.  Using a sterile surgical marker, an appropriate hinge flap was drawn incorporating the defect. The area thus outlined was incised with a #15 scalpel blade.  The skin margins were undermined to an appropriate distance in all directions utilizing iris scissors.

## 2023-06-15 NOTE — ED PROVIDER NOTE - CADM POA URETHRAL CATHETER
How Severe Are Your Spot(S)?: mild What Is The Reason For Today's Visit?: Full Body Skin Examination with No Concerns What Is The Reason For Today's Visit? (Being Monitored For X): concerning skin lesions on a periodic basis No English

## 2023-07-18 ENCOUNTER — RESULT CHARGE (OUTPATIENT)
Age: 58
End: 2023-07-18

## 2023-08-14 ENCOUNTER — APPOINTMENT (OUTPATIENT)
Dept: CARDIOLOGY | Facility: CLINIC | Age: 58
End: 2023-08-14
Payer: COMMERCIAL

## 2023-08-14 ENCOUNTER — RESULT CHARGE (OUTPATIENT)
Age: 58
End: 2023-08-14

## 2023-08-14 VITALS
SYSTOLIC BLOOD PRESSURE: 124 MMHG | HEART RATE: 48 BPM | HEIGHT: 68 IN | WEIGHT: 204 LBS | BODY MASS INDEX: 30.92 KG/M2 | DIASTOLIC BLOOD PRESSURE: 80 MMHG

## 2023-08-14 PROCEDURE — 93000 ELECTROCARDIOGRAM COMPLETE: CPT

## 2023-08-14 PROCEDURE — 99214 OFFICE O/P EST MOD 30 MIN: CPT | Mod: 25

## 2023-08-14 NOTE — HISTORY OF PRESENT ILLNESS
[FreeTextEntry1] : 57 y/o male s/p NSTEMI, s/p PCI of LCX/OM and RCA. Quit smoking. Pt denies any  further chest pain, no dyspnea, no edema.  Compliant with medications. As per pt he feels well.  Pt is staying active without cardiac issues.  Pt walks 3 miles few times per week.  Reviewed recent labs from [facility] - LDL is in the 30's.  Pt monitors his B/p at home ranging around 100-110 range. HR 48.

## 2023-08-14 NOTE — ASSESSMENT
[FreeTextEntry1] : 59 y/o male with history of NSTEMI, s/p PCI of LCX and RCA 11/26/21 w/ZOE x 3.  Hospital records reviewed Cath films, IVUS, Echo, ECG's, Labs reviewed Medications reviewed   C/w medical therapy Completed DAPT - c/w ASA 81 mg. Off Plavix Decrease Atorvastatin to 20 mg. Bradycardia - decrease metoprolol to 25 mg. If still ahsan, will d/c. Secondary prevention. Continued avoidance of tobacco recommended and discussed. Patient quit after the MI Patient was advised about healthy lifestyle changes, including diet and exercise. Importance of sustained long-term weight loss was discussed, questions answered. Repeat labs before next visit - maybe able to decrease Lipitor dose further. F/u in 6 months.

## 2023-10-10 ENCOUNTER — RX RENEWAL (OUTPATIENT)
Age: 58
End: 2023-10-10

## 2023-12-19 ENCOUNTER — RESULT CHARGE (OUTPATIENT)
Age: 58
End: 2023-12-19

## 2023-12-19 ENCOUNTER — APPOINTMENT (OUTPATIENT)
Dept: CARDIOLOGY | Facility: CLINIC | Age: 58
End: 2023-12-19
Payer: COMMERCIAL

## 2023-12-19 VITALS
DIASTOLIC BLOOD PRESSURE: 72 MMHG | HEART RATE: 50 BPM | WEIGHT: 200 LBS | SYSTOLIC BLOOD PRESSURE: 134 MMHG | BODY MASS INDEX: 30.31 KG/M2 | HEIGHT: 68 IN

## 2023-12-19 PROCEDURE — 99214 OFFICE O/P EST MOD 30 MIN: CPT | Mod: 25

## 2023-12-19 PROCEDURE — 93000 ELECTROCARDIOGRAM COMPLETE: CPT

## 2023-12-19 NOTE — HISTORY OF PRESENT ILLNESS
[FreeTextEntry1] : 59 y/o male s/p NSTEMI, s/p PCI of LCX/OM and RCA. Quit smoking. Pt denies any further chest pain, no dyspnea, no edema.  Compliant with medications. As per pt he feels well.  Pt is staying active without cardiac issues.  Pt walks 3 miles few times per week.  Reviewed recent labs from [facility] - LDL is in the 30's.  Pt monitors his B/p at home ranging around 100-110 range. HR 40-50.  As per pt no recent labs.  Pt reports no chest discomfort.

## 2023-12-19 NOTE — ASSESSMENT
[FreeTextEntry1] : 57 y/o male with history of NSTEMI, s/p PCI of LCX and RCA 11/26/21 w/ZOE x 3.  Hospital records reviewed Cath films, IVUS, Echo, ECG's, Labs reviewed Medications reviewed   C/w medical therapy Completed DAPT - c/w ASA 81 mg. Off Plavix Decrease Atorvastatin to 20 mg. Repeat labs - will adjust dose accordingly. Compliance discussed. Bradycardia despite decreasing metoprolol to 25 mg, will d/c. Discussed with patient and his wife. Secondary prevention. Continued avoidance of tobacco recommended and discussed. Patient quit after the MI. His wife still smokes - advise her to quit as well, both for her own health and second hand smoking for him. Patient was advised about healthy lifestyle changes, including diet and exercise. Importance of sustained long-term weight loss was discussed, questions answered. Repeat labs before next visit - Rx given F/u in 4-6 months.

## 2024-02-22 ENCOUNTER — RX RENEWAL (OUTPATIENT)
Age: 59
End: 2024-02-22

## 2024-05-21 ENCOUNTER — RESULT CHARGE (OUTPATIENT)
Age: 59
End: 2024-05-21

## 2024-07-01 ENCOUNTER — APPOINTMENT (OUTPATIENT)
Dept: CARDIOLOGY | Facility: CLINIC | Age: 59
End: 2024-07-01
Payer: COMMERCIAL

## 2024-07-01 ENCOUNTER — RESULT CHARGE (OUTPATIENT)
Age: 59
End: 2024-07-01

## 2024-07-01 VITALS
BODY MASS INDEX: 31.37 KG/M2 | HEART RATE: 58 BPM | HEIGHT: 68 IN | DIASTOLIC BLOOD PRESSURE: 70 MMHG | SYSTOLIC BLOOD PRESSURE: 140 MMHG | WEIGHT: 207 LBS

## 2024-07-01 VITALS — DIASTOLIC BLOOD PRESSURE: 70 MMHG | SYSTOLIC BLOOD PRESSURE: 132 MMHG

## 2024-07-01 DIAGNOSIS — Z98.61 ATHEROSCLEROTIC HEART DISEASE OF NATIVE CORONARY ARTERY W/OUT ANGINA PECTORIS: ICD-10-CM

## 2024-07-01 DIAGNOSIS — E78.5 HYPERLIPIDEMIA, UNSPECIFIED: ICD-10-CM

## 2024-07-01 DIAGNOSIS — I10 ESSENTIAL (PRIMARY) HYPERTENSION: ICD-10-CM

## 2024-07-01 DIAGNOSIS — I25.10 ATHEROSCLEROTIC HEART DISEASE OF NATIVE CORONARY ARTERY W/OUT ANGINA PECTORIS: ICD-10-CM

## 2024-07-01 PROCEDURE — G2211 COMPLEX E/M VISIT ADD ON: CPT

## 2024-07-01 PROCEDURE — 93000 ELECTROCARDIOGRAM COMPLETE: CPT

## 2024-07-01 PROCEDURE — 99214 OFFICE O/P EST MOD 30 MIN: CPT

## 2025-02-03 ENCOUNTER — RESULT CHARGE (OUTPATIENT)
Age: 60
End: 2025-02-03

## 2025-02-03 ENCOUNTER — APPOINTMENT (OUTPATIENT)
Dept: CARDIOLOGY | Facility: CLINIC | Age: 60
End: 2025-02-03
Payer: COMMERCIAL

## 2025-02-03 ENCOUNTER — NON-APPOINTMENT (OUTPATIENT)
Age: 60
End: 2025-02-03

## 2025-02-03 VITALS
HEIGHT: 68 IN | BODY MASS INDEX: 31.52 KG/M2 | DIASTOLIC BLOOD PRESSURE: 70 MMHG | WEIGHT: 208 LBS | SYSTOLIC BLOOD PRESSURE: 124 MMHG | HEART RATE: 60 BPM

## 2025-02-03 DIAGNOSIS — E78.5 HYPERLIPIDEMIA, UNSPECIFIED: ICD-10-CM

## 2025-02-03 DIAGNOSIS — Z98.61 ATHEROSCLEROTIC HEART DISEASE OF NATIVE CORONARY ARTERY W/OUT ANGINA PECTORIS: ICD-10-CM

## 2025-02-03 DIAGNOSIS — I10 ESSENTIAL (PRIMARY) HYPERTENSION: ICD-10-CM

## 2025-02-03 DIAGNOSIS — I25.10 ATHEROSCLEROTIC HEART DISEASE OF NATIVE CORONARY ARTERY W/OUT ANGINA PECTORIS: ICD-10-CM

## 2025-02-03 PROCEDURE — 99214 OFFICE O/P EST MOD 30 MIN: CPT

## 2025-02-03 PROCEDURE — 93000 ELECTROCARDIOGRAM COMPLETE: CPT

## 2025-02-03 PROCEDURE — G2211 COMPLEX E/M VISIT ADD ON: CPT

## 2025-02-03 RX ORDER — OMEGA-3/DHA/EPA/FISH OIL 300-1000MG
1000 CAPSULE ORAL
Refills: 0 | Status: ACTIVE | COMMUNITY
Start: 2025-02-03

## 2025-04-09 ENCOUNTER — NON-APPOINTMENT (OUTPATIENT)
Age: 60
End: 2025-04-09

## 2025-06-30 ENCOUNTER — RX RENEWAL (OUTPATIENT)
Age: 60
End: 2025-06-30